# Patient Record
Sex: FEMALE | Race: WHITE | NOT HISPANIC OR LATINO | Employment: OTHER | URBAN - METROPOLITAN AREA
[De-identification: names, ages, dates, MRNs, and addresses within clinical notes are randomized per-mention and may not be internally consistent; named-entity substitution may affect disease eponyms.]

---

## 2017-01-09 ENCOUNTER — RECORDS - HEALTHEAST (OUTPATIENT)
Dept: MAMMOGRAPHY | Facility: CLINIC | Age: 62
End: 2017-01-09

## 2017-01-09 DIAGNOSIS — Z12.31 ENCOUNTER FOR SCREENING MAMMOGRAM FOR MALIGNANT NEOPLASM OF BREAST: ICD-10-CM

## 2017-02-20 ENCOUNTER — COMMUNICATION - HEALTHEAST (OUTPATIENT)
Dept: FAMILY MEDICINE | Facility: CLINIC | Age: 62
End: 2017-02-20

## 2017-02-20 DIAGNOSIS — R00.0 TACHYCARDIA: ICD-10-CM

## 2017-10-01 ENCOUNTER — COMMUNICATION - HEALTHEAST (OUTPATIENT)
Dept: FAMILY MEDICINE | Facility: CLINIC | Age: 62
End: 2017-10-01

## 2017-10-01 DIAGNOSIS — R00.0 TACHYCARDIA: ICD-10-CM

## 2017-11-17 ENCOUNTER — OFFICE VISIT - HEALTHEAST (OUTPATIENT)
Dept: FAMILY MEDICINE | Facility: CLINIC | Age: 62
End: 2017-11-17

## 2017-11-17 DIAGNOSIS — Z23 NEED FOR VARICELLA VACCINE: ICD-10-CM

## 2017-11-17 DIAGNOSIS — Z00.00 ROUTINE GENERAL MEDICAL EXAMINATION AT A HEALTH CARE FACILITY: ICD-10-CM

## 2017-11-17 DIAGNOSIS — Q21.11 OSTIUM SECUNDUM TYPE ATRIAL SEPTAL DEFECT: ICD-10-CM

## 2017-11-17 DIAGNOSIS — Z23 NEED FOR INFLUENZA VACCINATION: ICD-10-CM

## 2017-11-17 DIAGNOSIS — R00.2 PALPITATIONS: ICD-10-CM

## 2017-11-17 LAB
CHOLEST SERPL-MCNC: 188 MG/DL
FASTING STATUS PATIENT QL REPORTED: YES
HDLC SERPL-MCNC: 70 MG/DL
LDLC SERPL CALC-MCNC: 104 MG/DL
TRIGL SERPL-MCNC: 68 MG/DL

## 2017-11-17 ASSESSMENT — MIFFLIN-ST. JEOR: SCORE: 1266.88

## 2018-06-10 ENCOUNTER — COMMUNICATION - HEALTHEAST (OUTPATIENT)
Dept: FAMILY MEDICINE | Facility: CLINIC | Age: 63
End: 2018-06-10

## 2018-06-10 DIAGNOSIS — R00.2 PALPITATIONS: ICD-10-CM

## 2018-11-19 ENCOUNTER — OFFICE VISIT - HEALTHEAST (OUTPATIENT)
Dept: FAMILY MEDICINE | Facility: CLINIC | Age: 63
End: 2018-11-19

## 2018-11-19 DIAGNOSIS — Z00.00 ROUTINE GENERAL MEDICAL EXAMINATION AT A HEALTH CARE FACILITY: ICD-10-CM

## 2018-11-19 DIAGNOSIS — Q21.11 OSTIUM SECUNDUM TYPE ATRIAL SEPTAL DEFECT: ICD-10-CM

## 2018-11-19 DIAGNOSIS — Z12.11 COLON CANCER SCREENING: ICD-10-CM

## 2018-11-19 DIAGNOSIS — Z12.31 VISIT FOR SCREENING MAMMOGRAM: ICD-10-CM

## 2018-11-19 DIAGNOSIS — R00.2 PALPITATIONS: ICD-10-CM

## 2018-11-19 DIAGNOSIS — I48.91 ATRIAL FIBRILLATION, UNSPECIFIED TYPE (H): ICD-10-CM

## 2018-11-19 LAB
ALBUMIN SERPL-MCNC: 4 G/DL (ref 3.5–5)
ALP SERPL-CCNC: 84 U/L (ref 45–120)
ALT SERPL W P-5'-P-CCNC: 15 U/L (ref 0–45)
ANION GAP SERPL CALCULATED.3IONS-SCNC: 9 MMOL/L (ref 5–18)
AST SERPL W P-5'-P-CCNC: 22 U/L (ref 0–40)
BASOPHILS # BLD AUTO: 0 THOU/UL (ref 0–0.2)
BASOPHILS NFR BLD AUTO: 1 % (ref 0–2)
BILIRUB SERPL-MCNC: 0.7 MG/DL (ref 0–1)
BUN SERPL-MCNC: 17 MG/DL (ref 8–22)
CALCIUM SERPL-MCNC: 9.9 MG/DL (ref 8.5–10.5)
CHLORIDE BLD-SCNC: 105 MMOL/L (ref 98–107)
CHOLEST SERPL-MCNC: 202 MG/DL
CO2 SERPL-SCNC: 28 MMOL/L (ref 22–31)
CREAT SERPL-MCNC: 0.88 MG/DL (ref 0.6–1.1)
EOSINOPHIL # BLD AUTO: 0.2 THOU/UL (ref 0–0.4)
EOSINOPHIL NFR BLD AUTO: 5 % (ref 0–6)
ERYTHROCYTE [DISTWIDTH] IN BLOOD BY AUTOMATED COUNT: 12.7 % (ref 11–14.5)
FASTING STATUS PATIENT QL REPORTED: YES
GFR SERPL CREATININE-BSD FRML MDRD: >60 ML/MIN/1.73M2
GLUCOSE BLD-MCNC: 77 MG/DL (ref 70–125)
HCT VFR BLD AUTO: 42.9 % (ref 35–47)
HDLC SERPL-MCNC: 75 MG/DL
HGB BLD-MCNC: 14.1 G/DL (ref 12–16)
LDLC SERPL CALC-MCNC: 109 MG/DL
LYMPHOCYTES # BLD AUTO: 1.2 THOU/UL (ref 0.8–4.4)
LYMPHOCYTES NFR BLD AUTO: 28 % (ref 20–40)
MCH RBC QN AUTO: 28.3 PG (ref 27–34)
MCHC RBC AUTO-ENTMCNC: 32.9 G/DL (ref 32–36)
MCV RBC AUTO: 86 FL (ref 80–100)
MONOCYTES # BLD AUTO: 0.3 THOU/UL (ref 0–0.9)
MONOCYTES NFR BLD AUTO: 8 % (ref 2–10)
NEUTROPHILS # BLD AUTO: 2.4 THOU/UL (ref 2–7.7)
NEUTROPHILS NFR BLD AUTO: 58 % (ref 50–70)
PLATELET # BLD AUTO: 262 THOU/UL (ref 140–440)
PMV BLD AUTO: 6.9 FL (ref 7–10)
POTASSIUM BLD-SCNC: 4.4 MMOL/L (ref 3.5–5)
PROT SERPL-MCNC: 6.9 G/DL (ref 6–8)
RBC # BLD AUTO: 4.99 MILL/UL (ref 3.8–5.4)
SODIUM SERPL-SCNC: 142 MMOL/L (ref 136–145)
TRIGL SERPL-MCNC: 92 MG/DL
TSH SERPL DL<=0.005 MIU/L-ACNC: 1.84 UIU/ML (ref 0.3–5)
WBC: 4.2 THOU/UL (ref 4–11)

## 2018-11-19 ASSESSMENT — MIFFLIN-ST. JEOR: SCORE: 1251.01

## 2018-11-24 ENCOUNTER — COMMUNICATION - HEALTHEAST (OUTPATIENT)
Dept: FAMILY MEDICINE | Facility: CLINIC | Age: 63
End: 2018-11-24

## 2018-11-24 DIAGNOSIS — R00.2 PALPITATIONS: ICD-10-CM

## 2018-11-24 DIAGNOSIS — R00.0 TACHYCARDIA: ICD-10-CM

## 2018-11-26 ENCOUNTER — RECORDS - HEALTHEAST (OUTPATIENT)
Dept: MAMMOGRAPHY | Facility: CLINIC | Age: 63
End: 2018-11-26

## 2018-11-26 DIAGNOSIS — Z12.31 ENCOUNTER FOR SCREENING MAMMOGRAM FOR MALIGNANT NEOPLASM OF BREAST: ICD-10-CM

## 2019-01-04 ENCOUNTER — OFFICE VISIT - HEALTHEAST (OUTPATIENT)
Dept: FAMILY MEDICINE | Facility: CLINIC | Age: 64
End: 2019-01-04

## 2019-01-04 DIAGNOSIS — J01.00 ACUTE MAXILLARY SINUSITIS, RECURRENCE NOT SPECIFIED: ICD-10-CM

## 2019-08-30 ENCOUNTER — COMMUNICATION - HEALTHEAST (OUTPATIENT)
Dept: SCHEDULING | Facility: CLINIC | Age: 64
End: 2019-08-30

## 2019-08-30 ENCOUNTER — OFFICE VISIT - HEALTHEAST (OUTPATIENT)
Dept: FAMILY MEDICINE | Facility: CLINIC | Age: 64
End: 2019-08-30

## 2019-08-30 DIAGNOSIS — R31.9 BLOOD IN URINE: ICD-10-CM

## 2019-08-30 LAB
ALBUMIN UR-MCNC: NEGATIVE MG/DL
APPEARANCE UR: CLEAR
BACTERIA #/AREA URNS HPF: ABNORMAL HPF
BILIRUB UR QL STRIP: NEGATIVE
COLOR UR AUTO: YELLOW
GLUCOSE UR STRIP-MCNC: NEGATIVE MG/DL
HGB UR QL STRIP: ABNORMAL
KETONES UR STRIP-MCNC: NEGATIVE MG/DL
LEUKOCYTE ESTERASE UR QL STRIP: ABNORMAL
NITRATE UR QL: NEGATIVE
PH UR STRIP: 6.5 [PH] (ref 5–8)
RBC #/AREA URNS AUTO: ABNORMAL HPF
SP GR UR STRIP: 1.01 (ref 1–1.03)
SQUAMOUS #/AREA URNS AUTO: ABNORMAL LPF
UROBILINOGEN UR STRIP-ACNC: ABNORMAL
WBC #/AREA URNS AUTO: ABNORMAL HPF

## 2019-08-30 ASSESSMENT — MIFFLIN-ST. JEOR: SCORE: 1257.82

## 2019-09-02 LAB — BACTERIA SPEC CULT: ABNORMAL

## 2019-09-03 ENCOUNTER — COMMUNICATION - HEALTHEAST (OUTPATIENT)
Dept: FAMILY MEDICINE | Facility: CLINIC | Age: 64
End: 2019-09-03

## 2019-10-03 ENCOUNTER — COMMUNICATION - HEALTHEAST (OUTPATIENT)
Dept: FAMILY MEDICINE | Facility: CLINIC | Age: 64
End: 2019-10-03

## 2019-10-03 DIAGNOSIS — R00.2 PALPITATIONS: ICD-10-CM

## 2019-11-20 ENCOUNTER — COMMUNICATION - HEALTHEAST (OUTPATIENT)
Dept: FAMILY MEDICINE | Facility: CLINIC | Age: 64
End: 2019-11-20

## 2019-11-20 ENCOUNTER — OFFICE VISIT - HEALTHEAST (OUTPATIENT)
Dept: FAMILY MEDICINE | Facility: CLINIC | Age: 64
End: 2019-11-20

## 2019-11-20 DIAGNOSIS — Z11.59 ENCOUNTER FOR HEPATITIS C SCREENING TEST FOR LOW RISK PATIENT: ICD-10-CM

## 2019-11-20 DIAGNOSIS — Z23 NEED FOR SHINGLES VACCINE: ICD-10-CM

## 2019-11-20 DIAGNOSIS — Z00.00 ROUTINE GENERAL MEDICAL EXAMINATION AT A HEALTH CARE FACILITY: ICD-10-CM

## 2019-11-20 DIAGNOSIS — I51.7 CARDIOMEGALY: ICD-10-CM

## 2019-11-20 DIAGNOSIS — I48.91 ATRIAL FIBRILLATION, UNSPECIFIED TYPE (H): ICD-10-CM

## 2019-11-20 DIAGNOSIS — Z11.4 SCREENING FOR HUMAN IMMUNODEFICIENCY VIRUS WITHOUT PRESENCE OF RISK FACTORS: ICD-10-CM

## 2019-11-20 DIAGNOSIS — Q21.11 OSTIUM SECUNDUM TYPE ATRIAL SEPTAL DEFECT: ICD-10-CM

## 2019-11-20 DIAGNOSIS — Z23 NEED FOR INFLUENZA VACCINATION: ICD-10-CM

## 2019-11-20 LAB
ALBUMIN SERPL-MCNC: 4.1 G/DL (ref 3.5–5)
ALP SERPL-CCNC: 77 U/L (ref 45–120)
ALT SERPL W P-5'-P-CCNC: 15 U/L (ref 0–45)
ANION GAP SERPL CALCULATED.3IONS-SCNC: 9 MMOL/L (ref 5–18)
AST SERPL W P-5'-P-CCNC: 18 U/L (ref 0–40)
BASOPHILS # BLD AUTO: 0 THOU/UL (ref 0–0.2)
BASOPHILS NFR BLD AUTO: 1 % (ref 0–2)
BILIRUB SERPL-MCNC: 0.7 MG/DL (ref 0–1)
BUN SERPL-MCNC: 14 MG/DL (ref 8–22)
CALCIUM SERPL-MCNC: 10 MG/DL (ref 8.5–10.5)
CHLORIDE BLD-SCNC: 105 MMOL/L (ref 98–107)
CHOLEST SERPL-MCNC: 195 MG/DL
CO2 SERPL-SCNC: 27 MMOL/L (ref 22–31)
CREAT SERPL-MCNC: 1.03 MG/DL (ref 0.6–1.1)
EOSINOPHIL # BLD AUTO: 0.1 THOU/UL (ref 0–0.4)
EOSINOPHIL NFR BLD AUTO: 3 % (ref 0–6)
ERYTHROCYTE [DISTWIDTH] IN BLOOD BY AUTOMATED COUNT: 13.3 % (ref 11–14.5)
FASTING STATUS PATIENT QL REPORTED: YES
GFR SERPL CREATININE-BSD FRML MDRD: 54 ML/MIN/1.73M2
GLUCOSE BLD-MCNC: 95 MG/DL (ref 70–125)
HCT VFR BLD AUTO: 41.2 % (ref 35–47)
HDLC SERPL-MCNC: 73 MG/DL
HGB BLD-MCNC: 14.1 G/DL (ref 12–16)
HIV 1+2 AB+HIV1 P24 AG SERPL QL IA: NEGATIVE
LDLC SERPL CALC-MCNC: 102 MG/DL
LYMPHOCYTES # BLD AUTO: 1 THOU/UL (ref 0.8–4.4)
LYMPHOCYTES NFR BLD AUTO: 25 % (ref 20–40)
MCH RBC QN AUTO: 29.2 PG (ref 27–34)
MCHC RBC AUTO-ENTMCNC: 34.2 G/DL (ref 32–36)
MCV RBC AUTO: 85 FL (ref 80–100)
MONOCYTES # BLD AUTO: 0.3 THOU/UL (ref 0–0.9)
MONOCYTES NFR BLD AUTO: 8 % (ref 2–10)
NEUTROPHILS # BLD AUTO: 2.6 THOU/UL (ref 2–7.7)
NEUTROPHILS NFR BLD AUTO: 64 % (ref 50–70)
PLATELET # BLD AUTO: 277 THOU/UL (ref 140–440)
PMV BLD AUTO: 6.7 FL (ref 7–10)
POTASSIUM BLD-SCNC: 4.7 MMOL/L (ref 3.5–5)
PROT SERPL-MCNC: 6.8 G/DL (ref 6–8)
RBC # BLD AUTO: 4.83 MILL/UL (ref 3.8–5.4)
SODIUM SERPL-SCNC: 141 MMOL/L (ref 136–145)
TRIGL SERPL-MCNC: 102 MG/DL
TSH SERPL DL<=0.005 MIU/L-ACNC: 1.29 UIU/ML (ref 0.3–5)
WBC: 4 THOU/UL (ref 4–11)

## 2019-11-20 ASSESSMENT — MIFFLIN-ST. JEOR: SCORE: 1259.63

## 2019-11-21 LAB — HCV AB SERPL QL IA: NEGATIVE

## 2020-01-31 ENCOUNTER — AMBULATORY - HEALTHEAST (OUTPATIENT)
Dept: FAMILY MEDICINE | Facility: CLINIC | Age: 65
End: 2020-01-31

## 2020-01-31 DIAGNOSIS — Z23 NEED FOR SHINGLES VACCINE: ICD-10-CM

## 2020-02-05 ENCOUNTER — AMBULATORY - HEALTHEAST (OUTPATIENT)
Dept: NURSING | Facility: CLINIC | Age: 65
End: 2020-02-05

## 2020-02-05 DIAGNOSIS — Z23 NEED FOR SHINGLES VACCINE: ICD-10-CM

## 2020-09-07 ENCOUNTER — COMMUNICATION - HEALTHEAST (OUTPATIENT)
Dept: FAMILY MEDICINE | Facility: CLINIC | Age: 65
End: 2020-09-07

## 2020-09-07 DIAGNOSIS — I49.1 SUPRAVENTRICULAR PREMATURE BEATS: ICD-10-CM

## 2020-09-08 RX ORDER — ATENOLOL 25 MG/1
TABLET ORAL
Qty: 45 TABLET | Refills: 3 | Status: SHIPPED | OUTPATIENT
Start: 2020-09-08 | End: 2021-07-27

## 2020-11-04 ENCOUNTER — COMMUNICATION - HEALTHEAST (OUTPATIENT)
Dept: FAMILY MEDICINE | Facility: CLINIC | Age: 65
End: 2020-11-04

## 2020-11-04 ENCOUNTER — AMBULATORY - HEALTHEAST (OUTPATIENT)
Dept: FAMILY MEDICINE | Facility: CLINIC | Age: 65
End: 2020-11-04

## 2020-11-04 DIAGNOSIS — Z20.822 EXPOSURE TO COVID-19 VIRUS: ICD-10-CM

## 2021-03-03 ENCOUNTER — COMMUNICATION - HEALTHEAST (OUTPATIENT)
Dept: FAMILY MEDICINE | Facility: CLINIC | Age: 66
End: 2021-03-03

## 2021-05-25 ENCOUNTER — RECORDS - HEALTHEAST (OUTPATIENT)
Dept: ADMINISTRATIVE | Facility: CLINIC | Age: 66
End: 2021-05-25

## 2021-05-27 ENCOUNTER — RECORDS - HEALTHEAST (OUTPATIENT)
Dept: ADMINISTRATIVE | Facility: CLINIC | Age: 66
End: 2021-05-27

## 2021-05-28 ENCOUNTER — RECORDS - HEALTHEAST (OUTPATIENT)
Dept: ADMINISTRATIVE | Facility: CLINIC | Age: 66
End: 2021-05-28

## 2021-05-29 ENCOUNTER — RECORDS - HEALTHEAST (OUTPATIENT)
Dept: ADMINISTRATIVE | Facility: CLINIC | Age: 66
End: 2021-05-29

## 2021-05-30 ENCOUNTER — RECORDS - HEALTHEAST (OUTPATIENT)
Dept: ADMINISTRATIVE | Facility: CLINIC | Age: 66
End: 2021-05-30

## 2021-05-31 ENCOUNTER — RECORDS - HEALTHEAST (OUTPATIENT)
Dept: ADMINISTRATIVE | Facility: CLINIC | Age: 66
End: 2021-05-31

## 2021-05-31 VITALS — BODY MASS INDEX: 22.43 KG/M2 | WEIGHT: 148 LBS | HEIGHT: 68 IN

## 2021-05-31 NOTE — TELEPHONE ENCOUNTER
Blood in the urine started yesterday    Pushed fluids yesterday and felt better however during the night symptoms returned and blood in the urine is back    Now she feels like she has a fever    Blood in the urine    Pain with urine    Has an appointment later today.    In the meantime push fluids    Christina Troncoso RN   [facility] Medication Refill and Triage Nurse  8:15 AM  8/30/2019    Reason for Disposition    Pain or burning with passing urine    Protocols used: URINE - BLOOD IN-A-OH

## 2021-05-31 NOTE — PROGRESS NOTES
Assessment/ Plan  1. Blood in urine  Gross hematuria-witnessed  Presumed RBCs based on clots visualized, now gross hematuria resolved, only heme in urine  Presumed acute cystitis based on symptoms  Empiric treatment for 3 days and culture  If symptoms resolve and culture positive, no further work-up needed  If symptoms resolve and culture negative, needs further work-up to include kidney CT, urology evaluation which will include follow-up UA  Discussed all of this with patient.  - Urinalysis-UC if Indicated  - Culture, Urine  - sulfamethoxazole-trimethoprim (SEPTRA DS) 800-160 mg per tablet; Take 1 tablet by mouth 2 (two) times a day.  Dispense: 6 tablet; Refill: 0    Body mass index is 23.23 kg/m .    Subjective  CC:  Chief Complaint   Patient presents with     Hematuria     possibly UTI, frequently urinated     HPI:  Blood in urine  Narrative: Blood in urine with clots and some dysuria  ---------------------  Time of onset/duration: since yest am- started  Fine through the day  Blood and burning this am  Fel chilled  Timing /intermittent vs constant? intermittent  Severity/ appearance of urine: Brownish-red a few clots at its worst, pink at the beginning, now clear  History of trauma? no  Associated symptoms  Dysuria? yes  Urinary frequency? yes  Back pain? no  Fever/chills/sweats? Yes but isolated      Previous history of hematuria or kidney stones?  No  Patient Active Problem List   Diagnosis     Allergies     Carpal Tunnel Syndrome     Right Ventricular Enlargement     Palpitations     Atrial Premature Complex     Atrial Septal Defect     Migraine Headache     Benign Adenomatous Polyp Of The Large Intestine     Atrial Fibrillation     Tachycardia     Post-menopausal bleeding     Blood in urine     Current medications reviewed as follows:  Current Outpatient Medications on File Prior to Visit   Medication Sig     aspirin 81 MG EC tablet Take 162 mg by mouth daily.     atenolol (TENORMIN) 25 MG tablet Take 0.5  "tablets (12.5 mg total) by mouth daily.     b complex vitamins tablet Take 1 tablet by mouth daily.     loratadine (CLARITIN) 10 mg tablet Take 10 mg by mouth daily. Only takes between April- October     atenolol (TENORMIN) 25 MG tablet TAKE ONE-HALF TABLET BY  MOUTH DAILY     atenolol (TENORMIN) 25 MG tablet TAKE ONE-HALF TABLET BY  MOUTH DAILY     atenolol (TENORMIN) 25 MG tablet TAKE ONE-HALF TABLET BY  MOUTH DAILY     No current facility-administered medications on file prior to visit.      Social History     Tobacco Use   Smoking Status Never Smoker   Smokeless Tobacco Never Used     Social History     Social History Narrative     Not on file     Patient Care Team:  Lorie Ladd MD as PCP - General  ROS  Full 10 system review including constitutional, respiratory, cardiac, gi, urinary, rheumatologic, neurologic, reproductive, dermatologic psychiatric is  performed  and is otherwise negative         Objective  Physical Exam  Vitals:    08/30/19 1515   BP: 116/72   Patient Site: Right Arm   Patient Position: Sitting   Cuff Size: Adult Regular   Pulse: 80   Resp: 16   Temp: 97.6  F (36.4  C)   TempSrc: Oral   Weight: 148 lb (67.1 kg)   Height: 5' 6.93\" (1.7 m)     Abdomen soft and nontender, mild suprapubic tenderness on firm palpation.  No flank tenderness.  Patient does not appear acutely ill, is afebrile  Diagnostics  Results for orders placed or performed in visit on 08/30/19   Urinalysis-UC if Indicated   Result Value Ref Range    Color, UA Yellow Colorless, Yellow, Straw, Light Yellow    Clarity, UA Clear Clear    Glucose, UA Negative Negative    Bilirubin, UA Negative Negative    Ketones, UA Negative Negative    Specific Gravity, UA 1.010 1.005 - 1.030    Blood, UA Moderate (!) Negative    pH, UA 6.5 5.0 - 8.0    Protein, UA Negative Negative mg/dL    Urobilinogen, UA 0.2 E.U./dL 0.2 E.U./dL, 1.0 E.U./dL    Nitrite, UA Negative Negative    Leukocytes, UA Small (!) Negative    Bacteria, UA " Few (!) None Seen hpf    RBC, UA 0-2 None Seen, 0-2 hpf    WBC, UA 0-5 None Seen, 0-5 hpf    Squam Epithel, UA 0-5 None Seen, 0-5 lpf         Please note: Voice recognition software was used in this dictation.  It may therefore contain typographical errors.

## 2021-06-02 ENCOUNTER — RECORDS - HEALTHEAST (OUTPATIENT)
Dept: ADMINISTRATIVE | Facility: CLINIC | Age: 66
End: 2021-06-02

## 2021-06-02 VITALS — BODY MASS INDEX: 21.9 KG/M2 | HEIGHT: 68 IN | WEIGHT: 144.5 LBS

## 2021-06-02 VITALS — BODY MASS INDEX: 22.38 KG/M2 | WEIGHT: 145 LBS

## 2021-06-02 NOTE — TELEPHONE ENCOUNTER
Refill Approved    Rx renewed per Medication Renewal Policy. Medication was last renewed on 11/27/18.    Lindsay Ordonez, [facility] Triage/Med Refill 10/4/2019     Requested Prescriptions   Pending Prescriptions Disp Refills     atenolol (TENORMIN) 25 MG tablet [Pharmacy Med Name: ATENOLOL  25MG  TAB] 45 tablet      Sig: TAKE ONE-HALF TABLET BY  MOUTH DAILY       Beta-Blockers Refill Protocol Passed - 10/3/2019  8:29 PM        Passed - PCP or prescribing provider visit in past 12 months or next 3 months     Last office visit with prescriber/PCP: 6/6/2016 Lorie Ladd MD OR same dept: 8/30/2019 Enrique Montano MD OR same specialty: 8/30/2019 Enrique Montano MD  Last physical: 11/19/2018 Last MTM visit: Visit date not found   Next visit within 3 mo: Visit date not found  Next physical within 3 mo: Visit date not found  Prescriber OR PCP: Lorie Ladd MD  Last diagnosis associated with med order: There are no diagnoses linked to this encounter.  If protocol passes may refill for 12 months if within 3 months of last provider visit (or a total of 15 months).             Passed - Blood pressure filed in past 12 months     BP Readings from Last 1 Encounters:   08/30/19 116/72

## 2021-06-03 VITALS — WEIGHT: 148 LBS | BODY MASS INDEX: 23.23 KG/M2 | HEIGHT: 67 IN

## 2021-06-03 NOTE — PROGRESS NOTES
Assessment:      Healthy female exam.    1. Routine general medical examination at a health care facility  Comprehensive Metabolic Panel    Lipid Profile    HM1(CBC and Differential)    Thyroid Stimulating Hormone (TSH)    HM1 (CBC with Diff)   2. Need for shingles vaccine  Varicella Zoster, Recombinant Vaccine IM   3. Need for influenza vaccination  Influenza, Recombinant, Inj, Quadrivalent, PF, 18+YRS   4. Screening for human immunodeficiency virus without presence of risk factors  HIV Antigen/Antibody Screening [facility]   5. Encounter for hepatitis C screening test for low risk patient  Hepatitis C Antibody (Anti-HCV)   6. Right Ventricular Enlargement  aspirin 81 MG EC tablet   7. Atrial Septal Defect     8. Atrial fibrillation, unspecified type (H)            Plan:       This is a 63 yo female here for physical exam;  1.  Health Maintenance - screening labs sent; due for shingles vaccine - ordered; due for seasonal influenza vaccine - ordered; discussed recommendation for HIV and hepatitis C screening - ordered  2.  Atrial Septal defect/right ventricular enlargement/atrial fibrillation - patient has remained stable in terms of cardiac function - continue ASA 81 mg daily.     Subjective:      Summer Kendrick is a 64 y.o. female who presents for an annual exam. The patient reports that there is not domestic violence in her life.     Here for physical  Stressed by mom - needing increasing cares -     Healthy Habits:   Regular Exercise: Yes  Sunscreen Use: Yes  Healthy Diet: Yes  Dental Visits Regularly: Yes  Seat Belt: Yes  Sexually active: No  Self Breast Exam Monthly:Yes  Hemoccults: No  Flex Sig: No  Colonoscopy: Yes        Immunization History   Administered Date(s) Administered     DT (pediatric) 11/09/2005     INFLUENZA,RECOMBINANT,INJ,PF QUADRIVALENT 18+YRS 11/19/2018, 11/20/2019     Influenza, seasonal,quad inj 6-35 mos 09/25/2009, 09/15/2014     Influenza,seasonal, Inj IIV3 11/09/2005, 11/14/2006      Influenza,seasonal,quad inj =/> 6months 2017     Td,adult,historic,unspecified 2005     Tdap 2012     ZOSTER, LIVE 2017     ZOSTER, RECOMBINANT, IM 2019     Immunization status: reviewed.    No exam data present    Gynecologic History  Patient's last menstrual period was 2014.  Contraception: post menopausal status  Last Pap: 16 Results were: normal  Last mammogram: 18. Results were: normal      OB History    Para Term  AB Living   3 3 3         SAB TAB Ectopic Multiple Live Births                  # Outcome Date GA Lbr Gurjit/2nd Weight Sex Delivery Anes PTL Lv   3 Term            2 Term            1 Term                Current Outpatient Medications   Medication Sig Dispense Refill     aspirin 81 MG EC tablet Take 1 tablet (81 mg total) by mouth every other day.  0     atenolol (TENORMIN) 25 MG tablet Take 0.5 tablets (12.5 mg total) by mouth daily. 10 tablet 0     atenolol (TENORMIN) 25 MG tablet TAKE ONE-HALF TABLET BY  MOUTH DAILY 45 tablet 3     atenolol (TENORMIN) 25 MG tablet TAKE ONE-HALF TABLET BY  MOUTH DAILY 45 tablet 3     b complex vitamins tablet Take 1 tablet by mouth daily.       loratadine (CLARITIN) 10 mg tablet Take 10 mg by mouth daily. Only takes between April- October       sulfamethoxazole-trimethoprim (SEPTRA DS) 800-160 mg per tablet Take 1 tablet by mouth 2 (two) times a day. 6 tablet 0     No current facility-administered medications for this visit.      History reviewed. No pertinent past medical history.  Past Surgical History:   Procedure Laterality Date     SD REASD W BYPASS      Description: Repair Of Atrial Secundum Septal Defect;  Recorded: 2014;     Nickel and Penicillins  Family History   Problem Relation Age of Onset     Breast cancer Mother 55     Social History     Socioeconomic History     Marital status:      Spouse name: Not on file     Number of children: Not on file     Years of education: Not on file  "    Highest education level: Not on file   Occupational History     Not on file   Social Needs     Financial resource strain: Not on file     Food insecurity:     Worry: Not on file     Inability: Not on file     Transportation needs:     Medical: Not on file     Non-medical: Not on file   Tobacco Use     Smoking status: Never Smoker     Smokeless tobacco: Never Used   Substance and Sexual Activity     Alcohol use: Yes     Alcohol/week: 1.0 standard drinks     Types: 1 Glasses of wine per week     Comment: Ocasional     Drug use: No     Sexual activity: Yes     Partners: Male   Lifestyle     Physical activity:     Days per week: Not on file     Minutes per session: Not on file     Stress: Not on file   Relationships     Social connections:     Talks on phone: Not on file     Gets together: Not on file     Attends Worship service: Not on file     Active member of club or organization: Not on file     Attends meetings of clubs or organizations: Not on file     Relationship status: Not on file     Intimate partner violence:     Fear of current or ex partner: Not on file     Emotionally abused: Not on file     Physically abused: Not on file     Forced sexual activity: Not on file   Other Topics Concern     Not on file   Social History Narrative     Not on file       Review of Systems  Review of Systems      Pertinent positives as noted in HPI; otherwise 12 point ROS negative.      Objective:         Vitals:    11/20/19 0809   BP: 92/70   Pulse: 92   Resp: 16   Temp: 98.1  F (36.7  C)   TempSrc: Oral   Weight: 146 lb 6.4 oz (66.4 kg)   Height: 5' 7.5\" (1.715 m)     Body mass index is 22.59 kg/m .    Physical  Physical Exam    EXAM:  BP 92/70 (Patient Site: Right Arm, Patient Position: Sitting, Cuff Size: Adult Regular)   Pulse 92   Temp 98.1  F (36.7  C) (Oral)   Resp 16   Ht 5' 7.5\" (1.715 m)   Wt 146 lb 6.4 oz (66.4 kg)   LMP 01/09/2014   BMI 22.59 kg/m     Gen:  NAD, appears well, well-hydrated  HEENT:  TMs " nl, oropharynx benign, nasal mucosa nl, conjunctiva clear  Neck:  Supple, no adenopathy, no thyromegaly, no carotid bruits, no JVD  Lungs:  Clear to auscultation bilaterally  Breast exam:  No breast lumps, no skin changes, no nipple discharge, no axillary adenopathy  Cor:  RRR no murmur  Abd:  Soft, nontender, BS+, no masses, no guarding or rebound, no HSM  Extr:  Neg., no significant DJD - knees, hips, ankles  Neuro:  No asymmetry, Nl motor tone/strength, nl sensation, reflexes =, gait nl, nl coordination, CN intact,   Skin:  Warm/dry

## 2021-06-04 VITALS
HEART RATE: 92 BPM | WEIGHT: 146.4 LBS | HEIGHT: 68 IN | TEMPERATURE: 98.1 F | BODY MASS INDEX: 22.19 KG/M2 | RESPIRATION RATE: 16 BRPM | DIASTOLIC BLOOD PRESSURE: 70 MMHG | SYSTOLIC BLOOD PRESSURE: 92 MMHG

## 2021-06-14 NOTE — PROGRESS NOTES
Assessment:      Healthy female exam.    1. Routine general medical examination at a health care facility  Comprehensive Metabolic Panel    Lipid Profile    Thyroid Stimulating Hormone (TSH)    Vitamin D, Total (25-Hydroxy)    Hemoglobin   2. Need for influenza vaccination  Influenza, Seasonal,Quad Inj, 36+ MOS   3. Need for varicella vaccine  Varicella-zoster vaccine subq   4. Palpitations     5. Atrial Septal Defect            Plan:      This is a 61 yo female - here for physical exam - no specific concerns addressed today.  Health maintenance - due for influenza vaccine and shingles vaccine - these were ordered and given today.  Has h/o ASD - surgically repaired - palpitations are minimal at this point.  Will check routine labs as noted.    Subjective:      Summer Kendrick is a 62 y.o. female who presents for an annual exam. The patient reports that there is not domestic violence in her life.     Healthy - no concerns    Healthy Habits:   Regular Exercise: Yes  Sunscreen Use: No  Healthy Diet: Yes  Dental Visits Regularly: Yes  Seat Belt: Yes  Sexually active: Yes  Self Breast Exam Monthly:Yes  Hemoccults: No  Flex Sig: No  Colonoscopy: \yes, up to date    Immunization History   Administered Date(s) Administered     DT (pediatric) 2005     Influenza, seasonal,quad inj 36+ mos 2017     Influenza, seasonal,quad inj 6-35 mos 2009, 09/15/2014     Td,adult,historic,unspecified 2005     Tdap 2012     ZOSTER 2017     Immunization status: reviewed - recommended vaccines as orderewd.    No exam data present    Gynecologic History  Patient's last menstrual period was 2014.  Contraception: post menopausal status  Last Pap: . Results were: normal  Last mammogram: 2017. Results were: normal      OB History    Para Term  AB Living   3 3 3      SAB TAB Ectopic Multiple Live Births             # Outcome Date GA Lbr Gurjit/2nd Weight Sex Delivery Anes PTL Lv   3 Term        "     2 Term            1 Term                   Current Outpatient Prescriptions   Medication Sig Dispense Refill     aspirin 81 MG EC tablet Take 162 mg by mouth daily.       atenolol (TENORMIN) 25 MG tablet Take 0.5 tablets (12.5 mg total) by mouth daily. 10 tablet 0     atenolol (TENORMIN) 25 MG tablet TAKE ONE-HALF TABLET BY  MOUTH DAILY 45 tablet 2     b complex vitamins tablet Take 1 tablet by mouth daily.       loratadine (CLARITIN) 10 mg tablet Take 10 mg by mouth daily. Only takes between April- October       No current facility-administered medications for this visit.      History reviewed. No pertinent past medical history.  Past Surgical History:   Procedure Laterality Date     MS REASD W BYPASS      Description: Repair Of Atrial Secundum Septal Defect;  Recorded: 06/03/2014;     Nickel and Penicillins  Family History   Problem Relation Age of Onset     Breast cancer Mother 55     Social History     Social History     Marital status:      Spouse name: N/A     Number of children: N/A     Years of education: N/A     Occupational History     Not on file.     Social History Main Topics     Smoking status: Never Smoker     Smokeless tobacco: Never Used     Alcohol use Not on file     Drug use: Not on file     Sexual activity: Not on file     Other Topics Concern     Not on file     Social History Narrative       Review of Systems  Review of Systems     Pertinent positives as noted in HPI; otherwise 12 point ROS negative.        Objective:         Vitals:    11/17/17 0741   BP: 100/60   Pulse: (!) 56   Resp: 16   Weight: 148 lb (67.1 kg)   Height: 5' 7.5\" (1.715 m)     Body mass index is 22.84 kg/(m^2).    Physical  Physical Exam    EXAM:  /60 (Patient Site: Right Arm, Patient Position: Sitting, Cuff Size: Adult Regular)  Pulse (!) 56  Resp 16  Ht 5' 7.5\" (1.715 m)  Wt 148 lb (67.1 kg)  LMP 01/09/2014  BMI 22.84 kg/m2   Gen:  NAD, appears well, well-hydrated  HEENT:  TMs nl, oropharynx " benign, nasal mucosa nl, conjunctiva clear  Neck:  Supple, no adenopathy, no thyromegaly, no carotid bruits, no JVD  Lungs:  Clear to auscultation bilaterally  Cor:  RRR no murmur  Abd:  Soft, nontender, BS+, no masses, no guarding or rebound, no HSM  Extr:  Neg.  Neuro:  No asymmetry  Skin:  Warm/dry

## 2021-06-16 PROBLEM — R31.9 BLOOD IN URINE: Status: ACTIVE | Noted: 2019-08-30

## 2021-06-19 NOTE — LETTER
Letter by Enrique Montano MD at      Author: Enrique Montano MD Service: -- Author Type: --    Filed:  Encounter Date: 9/3/2019 Status: (Other)         Summer Kendrick  [address] [place] [address]             September 3, 2019         Dear Ms. Kendrick,    Below are the results from your recent visit:    Resulted Orders   Urinalysis-UC if Indicated   Result Value Ref Range    Color, UA Yellow Colorless, Yellow, Straw, Light Yellow    Clarity, UA Clear Clear    Glucose, UA Negative Negative    Bilirubin, UA Negative Negative    Ketones, UA Negative Negative    Specific Gravity, UA 1.010 1.005 - 1.030    Blood, UA Moderate (!) Negative    pH, UA 6.5 5.0 - 8.0    Protein, UA Negative Negative mg/dL    Urobilinogen, UA 0.2 E.U./dL 0.2 E.U./dL, 1.0 E.U./dL    Nitrite, UA Negative Negative    Leukocytes, UA Small (!) Negative    Bacteria, UA Few (!) None Seen hpf    RBC, UA 0-2 None Seen, 0-2 hpf    WBC, UA 0-5 None Seen, 0-5 hpf    Squam Epithel, UA 0-5 None Seen, 0-5 lpf    Narrative    Urine Culture ordered based on [facility] criteria   Culture, Urine   Result Value Ref Range    Culture >100,000 col/ml Escherichia coli (!)        Summer, your urine culture came back positive.  If you feel better and your urine has completely cleared up, I do not think you need any special follow-up.    Please call with questions or contact us using [facility].    Sincerely,        Electronically signed by Enrique Montano MD

## 2021-06-21 NOTE — PROGRESS NOTES
Assessment:      Healthy female exam.  1. Routine general medical examination at a health care facility     2. Colon cancer screening  Ambulatory referral for Colonoscopy   3. Visit for screening mammogram  Mammo Screening Bilateral   4. Palpitations  Comprehensive Metabolic Panel    Lipid Profile    HM1(CBC and Differential)    Thyroid Stimulating Hormone (TSH)    HM1 (CBC with Diff)   5. Atrial Septal Defect     6. Atrial fibrillation, unspecified type (H)            Plan:      This is a 62 yo female with generally good health habits, here for physical exam:  1.  Health Maintenance - due for colonoscopy - referred; due for breast cancer screening - referred for mammogram; up to date on cervix cancer screening  2.  ASD with atrial fib - s/p surgical intervention - continues to do well     Subjective:      Summer Kendrick is a 63 y.o. female who presents for an annual exam. The patient is sexually active. The patient participates in regular exercise: yes. The patient reports that there is not domestic violence in her life.     Here for physical exam - no new concerns    Healthy Habits:   Regular Exercise: Yes  Sunscreen Use: Yes  Healthy Diet: Yes  Dental Visits Regularly: Yes  Seat Belt: Yes  Sexually active: Yes  Self Breast Exam Monthly:Yes  Hemoccults: No  Flex Sig: No  Colonoscopy: Yes  Lipid Profile: Yes  Glucose Screen: Yes  Prevention of Osteoporosis: No  Last Dexa: N/A  Guns at Home:  No      Immunization History   Administered Date(s) Administered     DT (pediatric) 11/09/2005     INFLUENZA,RECOMBINANT,INJ,PF QUADRIVALENT 18+YRS 11/19/2018     Influenza, seasonal,quad inj 36+ mos 11/17/2017     Influenza, seasonal,quad inj 6-35 mos 09/25/2009, 09/15/2014     Td,adult,historic,unspecified 11/09/2005     Tdap 08/22/2012     ZOSTER, LIVE 11/17/2017     Immunization status: up to date and documented, Flu vaccine given today..    No exam data present    Gynecologic History  Patient's last menstrual period was  2014.  Contraception: post menopausal status  Last Pap: 2016. Results were: normal  Last mammogram: 2017. Results were: normal      OB History    Para Term  AB Living   3 3 3         SAB TAB Ectopic Multiple Live Births                  # Outcome Date GA Lbr Gurjit/2nd Weight Sex Delivery Anes PTL Lv   3 Term            2 Term            1 Term                   Current Outpatient Medications   Medication Sig Dispense Refill     aspirin 81 MG EC tablet Take 162 mg by mouth daily.       atenolol (TENORMIN) 25 MG tablet Take 0.5 tablets (12.5 mg total) by mouth daily. 10 tablet 0     atenolol (TENORMIN) 25 MG tablet TAKE ONE-HALF TABLET BY  MOUTH DAILY 45 tablet 2     atenolol (TENORMIN) 25 MG tablet TAKE ONE-HALF TABLET BY  MOUTH DAILY 45 tablet 1     b complex vitamins tablet Take 1 tablet by mouth daily.       loratadine (CLARITIN) 10 mg tablet Take 10 mg by mouth daily. Only takes between April- October       No current facility-administered medications for this visit.      History reviewed. No pertinent past medical history.  Past Surgical History:   Procedure Laterality Date     CO REASD W BYPASS      Description: Repair Of Atrial Secundum Septal Defect;  Recorded: 2014;     Nickel and Penicillins  Family History   Problem Relation Age of Onset     Breast cancer Mother 55     Social History     Socioeconomic History     Marital status:      Spouse name: Not on file     Number of children: Not on file     Years of education: Not on file     Highest education level: Not on file   Social Needs     Financial resource strain: Not on file     Food insecurity - worry: Not on file     Food insecurity - inability: Not on file     Transportation needs - medical: Not on file     Transportation needs - non-medical: Not on file   Occupational History     Not on file   Tobacco Use     Smoking status: Never Smoker     Smokeless tobacco: Never Used   Substance and Sexual Activity     Alcohol  "use: Yes     Alcohol/week: 0.6 oz     Types: 1 Glasses of wine per week     Comment: Ocasional     Drug use: No     Sexual activity: Yes     Partners: Male   Other Topics Concern     Not on file   Social History Narrative     Not on file       Review of Systems  Review of Systems     Pertinent positives as noted in HPI; otherwise 12 point ROS negative.        Objective:         Vitals:    11/19/18 0737   BP: 102/68   Pulse: 62   Resp: 17   Temp: 97.8  F (36.6  C)   TempSrc: Oral   SpO2: 98%   Weight: 144 lb 8 oz (65.5 kg)   Height: 5' 7.5\" (1.715 m)     Body mass index is 22.3 kg/m .    Physical  Physical Exam    EXAM:  /68 (Patient Site: Right Arm, Patient Position: Sitting, Cuff Size: Adult Regular)   Pulse 62   Temp 97.8  F (36.6  C) (Oral)   Resp 17   Ht 5' 7.5\" (1.715 m)   Wt 144 lb 8 oz (65.5 kg)   LMP 01/09/2014   SpO2 98%   BMI 22.30 kg/m     Gen:  NAD, appears well, well-hydrated  HEENT:  TMs nl, oropharynx benign, nasal mucosa nl, conjunctiva clear  Neck:  Supple, no adenopathy, no thyromegaly, no carotid bruits, no JVD  Lungs:  Clear to auscultation bilaterally  Breast exam:  No breast lumps, no skin changes, no nipple discharge, no axillary adenopathy  Cor:  RRR no murmur  Abd:  Soft, nontender, BS+, no masses, no guarding or rebound, no HSM  Extr:  Neg.  Neuro:  No asymmetry, Nl motor tone/strength, nl sensation, reflexes =, gait nl, nl coordination, CN intact,   Skin:  Warm/dry        "

## 2021-06-22 NOTE — PROGRESS NOTES
Leaving [place] tomorrow    2 wks malar and teeth pain   Yellow green.   Cannot taste anything    Feels plugged.   Left malar but ears ok         OBJECTIVE:   Vitals:    01/04/19 1014   BP: 100/62   Pulse: 64   Resp: 20   Temp: 96.8  F (36  C)   SpO2: 99%      Wt is noted.  No diaphoresis  Eyes: nl eom, anicteric   External ears, nose: nl      Malar tender  Neck: nl nodes, supple, thyroid normal   Lungs: clear to ausc   Heart: regular rhythm  Abd: soft nontender     No cva (renal) tenderness  Neuro: no weakness  Skin no rash  Joints: uninflamed   No ketotic breath odor noted  Mental: euthymic  Ext: nontender calves   Gait: normal    Body mass index is 22.38 kg/m .      ASSESSMENT/PLAN:    1. Acute maxillary sinusitis, recurrence not specified  azithromycin (ZITHROMAX) 250 MG tablet    fluticasone (FLONASE) 50 mcg/actuation nasal spray     Etd.   / educated on nasal decongestant for the flight   More than 10 of fifteen total minutes time spent education counseling regarding the issues and care of same as listed in the assessment and plan of this note

## 2021-07-22 ENCOUNTER — RECORDS - HEALTHEAST (OUTPATIENT)
Dept: FAMILY MEDICINE | Facility: CLINIC | Age: 66
End: 2021-07-22

## 2021-07-22 DIAGNOSIS — Z12.31 OTHER SCREENING MAMMOGRAM: ICD-10-CM

## 2021-07-23 DIAGNOSIS — I49.1 SUPRAVENTRICULAR PREMATURE BEATS: ICD-10-CM

## 2021-07-27 RX ORDER — ATENOLOL 25 MG/1
TABLET ORAL
Qty: 45 TABLET | Refills: 3 | Status: SHIPPED | OUTPATIENT
Start: 2021-07-27 | End: 2022-09-02

## 2021-07-27 NOTE — TELEPHONE ENCOUNTER
"Routing refill request to provider for review/approval because:  Patient needs to be seen because it has been more than 1 year since last office visit.    Last Written Prescription Date:  9/8/2020  Last Fill Quantity: 45,  # refills: 3   Last office visit provider:  11/20/2019 Dr. Ladd     atenoloL (TENORMIN) 25 MG tablet 45 tablet 3 9/8/2020  No   Sig: TAKE ONE-HALF TABLET BY  MOUTH DAILY   Sent to pharmacy as: atenoloL 25 mg tablet (TENORMIN)   Notes to Pharmacy: Requesting 1 year supply   E-Prescribing Status: Receipt confirmed by pharmacy (9/8/2020  8:56 AM         Requested Prescriptions   Pending Prescriptions Disp Refills     atenolol (TENORMIN) 25 MG tablet [Pharmacy Med Name: ATENOLOL  25MG  TAB] 45 tablet 3     Sig: TAKE ONE-HALF TABLET BY  MOUTH DAILY       Beta-Blockers Protocol Failed - 7/23/2021  9:31 PM        Failed - Blood pressure under 140/90 in past 12 months     BP Readings from Last 3 Encounters:   11/20/19 92/70                 Failed - Recent (12 mo) or future (30 days) visit within the authorizing provider's specialty     Patient has had an office visit with the authorizing provider or a provider within the authorizing providers department within the previous 12 mos or has a future within next 30 days. See \"Patient Info\" tab in inbasket, or \"Choose Columns\" in Meds & Orders section of the refill encounter.              Passed - Patient is age 6 or older        Passed - Medication is active on med list             Lorraine Spence RN 07/27/21 4:33 PM  "

## 2021-08-21 ENCOUNTER — HEALTH MAINTENANCE LETTER (OUTPATIENT)
Age: 66
End: 2021-08-21

## 2021-10-16 ENCOUNTER — HEALTH MAINTENANCE LETTER (OUTPATIENT)
Age: 66
End: 2021-10-16

## 2021-10-20 DIAGNOSIS — Z13.220 LIPID SCREENING: ICD-10-CM

## 2021-10-20 DIAGNOSIS — I49.1 SUPRAVENTRICULAR PREMATURE BEATS: Primary | ICD-10-CM

## 2021-10-20 DIAGNOSIS — Z00.00 ADULT GENERAL MEDICAL EXAM: ICD-10-CM

## 2021-10-25 ENCOUNTER — LAB (OUTPATIENT)
Dept: LAB | Facility: CLINIC | Age: 66
End: 2021-10-25

## 2021-10-25 ENCOUNTER — OFFICE VISIT (OUTPATIENT)
Dept: FAMILY MEDICINE | Facility: CLINIC | Age: 66
End: 2021-10-25
Payer: COMMERCIAL

## 2021-10-25 VITALS
SYSTOLIC BLOOD PRESSURE: 119 MMHG | WEIGHT: 150.6 LBS | TEMPERATURE: 97.9 F | HEIGHT: 67 IN | HEART RATE: 67 BPM | BODY MASS INDEX: 23.64 KG/M2 | DIASTOLIC BLOOD PRESSURE: 79 MMHG

## 2021-10-25 DIAGNOSIS — Z00.00 ADULT GENERAL MEDICAL EXAM: Primary | ICD-10-CM

## 2021-10-25 DIAGNOSIS — Z13.220 LIPID SCREENING: ICD-10-CM

## 2021-10-25 DIAGNOSIS — Z00.00 ADULT GENERAL MEDICAL EXAM: ICD-10-CM

## 2021-10-25 DIAGNOSIS — R00.2 PALPITATIONS: ICD-10-CM

## 2021-10-25 DIAGNOSIS — Q21.11 OSTIUM SECUNDUM TYPE ATRIAL SEPTAL DEFECT: ICD-10-CM

## 2021-10-25 DIAGNOSIS — I49.1 SUPRAVENTRICULAR PREMATURE BEATS: ICD-10-CM

## 2021-10-25 PROBLEM — I51.7 RIGHT VENTRICULAR ENLARGEMENT: Status: ACTIVE | Noted: 2021-10-25

## 2021-10-25 PROBLEM — Q21.10 ATRIAL SEPTAL DEFECT: Status: ACTIVE | Noted: 2021-10-25

## 2021-10-25 LAB
ALBUMIN SERPL-MCNC: 4 G/DL (ref 3.5–5)
ALP SERPL-CCNC: 80 U/L (ref 45–120)
ALT SERPL W P-5'-P-CCNC: 11 U/L (ref 0–45)
ANION GAP SERPL CALCULATED.3IONS-SCNC: 10 MMOL/L (ref 5–18)
AST SERPL W P-5'-P-CCNC: 15 U/L (ref 0–40)
BILIRUB SERPL-MCNC: 0.8 MG/DL (ref 0–1)
BUN SERPL-MCNC: 13 MG/DL (ref 8–22)
CALCIUM SERPL-MCNC: 10.2 MG/DL (ref 8.5–10.5)
CHLORIDE BLD-SCNC: 106 MMOL/L (ref 98–107)
CHOLEST SERPL-MCNC: 181 MG/DL
CO2 SERPL-SCNC: 28 MMOL/L (ref 22–31)
CREAT SERPL-MCNC: 0.97 MG/DL (ref 0.6–1.1)
ERYTHROCYTE [DISTWIDTH] IN BLOOD BY AUTOMATED COUNT: 12.1 % (ref 10–15)
FASTING STATUS PATIENT QL REPORTED: YES
GFR SERPL CREATININE-BSD FRML MDRD: 62 ML/MIN/1.73M2
GLUCOSE BLD-MCNC: 95 MG/DL (ref 70–125)
HCT VFR BLD AUTO: 45 % (ref 35–47)
HDLC SERPL-MCNC: 62 MG/DL
HGB BLD-MCNC: 14.6 G/DL (ref 11.7–15.7)
LDLC SERPL CALC-MCNC: 97 MG/DL
MCH RBC QN AUTO: 29.1 PG (ref 26.5–33)
MCHC RBC AUTO-ENTMCNC: 32.4 G/DL (ref 31.5–36.5)
MCV RBC AUTO: 90 FL (ref 78–100)
PLATELET # BLD AUTO: 242 10E3/UL (ref 150–450)
POTASSIUM BLD-SCNC: 4.3 MMOL/L (ref 3.5–5)
PROT SERPL-MCNC: 6.7 G/DL (ref 6–8)
RBC # BLD AUTO: 5.01 10E6/UL (ref 3.8–5.2)
SODIUM SERPL-SCNC: 144 MMOL/L (ref 136–145)
TRIGL SERPL-MCNC: 109 MG/DL
TSH SERPL DL<=0.005 MIU/L-ACNC: 2.34 UIU/ML (ref 0.3–5)
WBC # BLD AUTO: 6.1 10E3/UL (ref 4–11)

## 2021-10-25 PROCEDURE — 90686 IIV4 VACC NO PRSV 0.5 ML IM: CPT | Performed by: FAMILY MEDICINE

## 2021-10-25 PROCEDURE — 84443 ASSAY THYROID STIM HORMONE: CPT

## 2021-10-25 PROCEDURE — 80061 LIPID PANEL: CPT

## 2021-10-25 PROCEDURE — 80053 COMPREHEN METABOLIC PANEL: CPT

## 2021-10-25 PROCEDURE — 99396 PREV VISIT EST AGE 40-64: CPT | Mod: 25 | Performed by: FAMILY MEDICINE

## 2021-10-25 PROCEDURE — 0004A PR COVID VAC PFIZER DIL RECON 30 MCG/0.3 ML IM: CPT | Performed by: FAMILY MEDICINE

## 2021-10-25 PROCEDURE — 91300 PR COVID VAC PFIZER DIL RECON 30 MCG/0.3 ML IM: CPT | Performed by: FAMILY MEDICINE

## 2021-10-25 PROCEDURE — 36415 COLL VENOUS BLD VENIPUNCTURE: CPT

## 2021-10-25 PROCEDURE — 90471 IMMUNIZATION ADMIN: CPT | Performed by: FAMILY MEDICINE

## 2021-10-25 PROCEDURE — 85027 COMPLETE CBC AUTOMATED: CPT

## 2021-10-25 RX ORDER — MULTIVITAMIN WITH IRON
TABLET ORAL
COMMUNITY
End: 2024-04-01

## 2021-10-25 ASSESSMENT — MIFFLIN-ST. JEOR: SCORE: 1270.24

## 2021-10-25 NOTE — PROGRESS NOTES
SUBJECTIVE:   CC: Summre Kendrick is an 66 year old woman who presents for preventive health visit.       Patient has been advised of split billing requirements and indicates understanding: Yes  Healthy Habits:     Getting at least 3 servings of Calcium per day:  Yes    Bi-annual eye exam:  Yes    Dental care twice a year:  Yes    Sleep apnea or symptoms of sleep apnea:  None    Diet:  Regular (no restrictions)    Frequency of exercise:  6-7 days/week    Duration of exercise:  45-60 minutes    Taking medications regularly:  Yes    Barriers to taking medications:  Not applicable    Medication side effects:  Not applicable    PHQ-2 Total Score: 0    Additional concerns today:  No      Today's PHQ-2 Score:   PHQ-2 ( 1999 [employer]) 10/19/2021   Q1: Little interest or pleasure in doing things 0   Q2: Feeling down, depressed or hopeless 0   PHQ-2 Score 0   Q1: Little interest or pleasure in doing things Not at all   Q2: Feeling down, depressed or hopeless Not at all   PHQ-2 Score 0       Abuse: Current or Past (Physical, Sexual or Emotional) - No  Do you feel safe in your environment? Yes    Have you ever done Advance Care Planning? (For example, a Health Directive, POLST, or a discussion with a medical provider or your loved ones about your wishes): Yes, patient states has an Advance Care Planning document and will bring a copy to the clinic.    Social History     Tobacco Use     Smoking status: Never Smoker     Smokeless tobacco: Never Used   Substance Use Topics     Alcohol use: Yes     Alcohol/week: 1.0 standard drinks     Comment: Alcoholic Drinks/day: Ocasional     If you drink alcohol do you typically have >3 drinks per day or >7 drinks per week? No    No flowsheet data found.    Reviewed orders with patient.  Reviewed health maintenance and updated orders accordingly - Yes  BP Readings from Last 3 Encounters:   10/25/21 119/79   11/20/19 92/70    Wt Readings from Last 3 Encounters:   10/25/21 68.3 kg (150 lb 9.6  oz)   11/20/19 66.4 kg (146 lb 6.4 oz)   08/30/19 67.1 kg (148 lb)                  Patient Active Problem List   Diagnosis     Allergies     Carpal Tunnel Syndrome     Right Ventricular Enlargement     Palpitations     Atrial Premature Complex     Atrial Septal Defect     Migraine Headache     Benign Adenomatous Polyp Of The Large Intestine     Atrial Fibrillation     Tachycardia     Post-menopausal bleeding     Blood in urine     Atrial septal defect     Right ventricular enlargement     Past Surgical History:   Procedure Laterality Date     C REPR ASD W BYPASS      Description: Repair Of Atrial Secundum Septal Defect;  Recorded: 06/03/2014;       Social History     Tobacco Use     Smoking status: Never Smoker     Smokeless tobacco: Never Used   Substance Use Topics     Alcohol use: Yes     Alcohol/week: 1.0 standard drinks     Comment: Alcoholic Drinks/day: Ocasional     Family History   Problem Relation Age of Onset     Breast Cancer Mother 55.00         Current Outpatient Medications   Medication Sig Dispense Refill     atenolol (TENORMIN) 25 MG tablet TAKE ONE-HALF TABLET BY  MOUTH DAILY 45 tablet 3     b complex vitamins tablet [B COMPLEX VITAMINS TABLET] Take 1 tablet by mouth daily.       cholecalciferol (VITAMIN D3) 10 mcg (400 units) TABS tablet        magnesium 250 MG tablet        Allergies   Allergen Reactions     Nickel Unknown     Penicillins Rash       Breast Cancer Screening:  Any new diagnosis of family breast, ovarian, or bowel cancer? No    FHS-7: No flowsheet data found.    Mammogram Screening: Recommended mammography every 1-2 years with patient discussion and risk factor consideration  Pertinent mammograms are reviewed under the imaging tab.    History of abnormal Pap smear: NO - age 30-65 PAP every 5 years with negative HPV co-testing recommended  PAP / HPV 11/11/2016 9/15/2014   PAP Negative for squamous intraepithelial lesion or malignancy  Electronically signed by Lucinda Alvarado CT  "([facility]) on 2016 at  8:49 AM   Negative for squamous intraepithelial lesion or malignancy  Electronically signed by Lucinda Alvarado CT ([facility]) on 2014 at  3:08 PM       Reviewed and updated as needed this visit by clinical staff  Tobacco  Allergies  Meds  Problems  Med Hx  Surg Hx  Fam Hx          Reviewed and updated as needed this visit by Provider  Tobacco  Allergies  Meds  Problems  Med Hx  Surg Hx  Fam Hx         History reviewed. No pertinent past medical history.   Past Surgical History:   Procedure Laterality Date     C REPR ASD W BYPASS      Description: Repair Of Atrial Secundum Septal Defect;  Recorded: 2014;     OB History    Para Term  AB Living   3 3 3 0 0 0   SAB TAB Ectopic Multiple Live Births   0 0 0 0 0      # Outcome Date GA Lbr Gurjit/2nd Weight Sex Delivery Anes PTL Lv   3 Term            2 Term            1 Term                Review of Systems   Cardiovascular: Negative for chest pain and palpitations.   Gastrointestinal: Negative for abdominal pain.   Musculoskeletal:        Occ hip pain     All other systems reviewed and are negative.         OBJECTIVE:   /79   Pulse 67   Temp 97.9  F (36.6  C)   Ht 1.709 m (5' 7.28\")   Wt 68.3 kg (150 lb 9.6 oz)   BMI 23.39 kg/m    Physical Exam  Constitutional:       General: She is not in acute distress.     Appearance: She is well-developed.   HENT:      Right Ear: Tympanic membrane and external ear normal.      Left Ear: Tympanic membrane and external ear normal.      Nose: Nose normal.      Mouth/Throat:      Pharynx: No oropharyngeal exudate.   Eyes:      General:         Right eye: No discharge.         Left eye: No discharge.      Conjunctiva/sclera: Conjunctivae normal.      Pupils: Pupils are equal, round, and reactive to light.   Neck:      Thyroid: No thyromegaly.      Trachea: No tracheal deviation.   Cardiovascular:      Rate and Rhythm: Normal rate and regular rhythm.      Pulses: Normal " pulses.      Heart sounds: Normal heart sounds, S1 normal and S2 normal. No murmur heard.   No friction rub. No S3 or S4 sounds.    Pulmonary:      Effort: Pulmonary effort is normal. No respiratory distress.      Breath sounds: Normal breath sounds. No wheezing or rales.   Chest:      Breasts:         Right: No mass, nipple discharge or tenderness.         Left: No mass, nipple discharge or tenderness.   Abdominal:      General: Bowel sounds are normal.      Palpations: Abdomen is soft. There is no mass.      Tenderness: There is no abdominal tenderness.   Musculoskeletal:         General: Normal range of motion.      Cervical back: Neck supple.   Lymphadenopathy:      Cervical: No cervical adenopathy.   Skin:     General: Skin is warm and dry.      Findings: No rash.   Neurological:      Mental Status: She is alert and oriented to person, place, and time.      Motor: No abnormal muscle tone.      Deep Tendon Reflexes: Reflexes are normal and symmetric.   Psychiatric:         Thought Content: Thought content normal.         Judgment: Judgment normal.         Diagnostic Test Results:  Labs reviewed in Epic  Results for orders placed or performed in visit on 10/25/21   Lipid Profile (Chol, Trig, HDL, LDL calc)     Status: None   Result Value Ref Range    Cholesterol 181 <=199 mg/dL    Triglycerides 109 <=149 mg/dL    Direct Measure HDL 62 >=50 mg/dL    LDL Cholesterol Calculated 97 <=129 mg/dL    Patient Fasting > 8hrs? Yes    CBC with platelets     Status: Normal   Result Value Ref Range    WBC Count 6.1 4.0 - 11.0 10e3/uL    RBC Count 5.01 3.80 - 5.20 10e6/uL    Hemoglobin 14.6 11.7 - 15.7 g/dL    Hematocrit 45.0 35.0 - 47.0 %    MCV 90 78 - 100 fL    MCH 29.1 26.5 - 33.0 pg    MCHC 32.4 31.5 - 36.5 g/dL    RDW 12.1 10.0 - 15.0 %    Platelet Count 242 150 - 450 10e3/uL   TSH     Status: Normal   Result Value Ref Range    TSH 2.34 0.30 - 5.00 uIU/mL   Comprehensive metabolic panel (BMP + Alb, Alk Phos, ALT, AST,  "Total. Bili, TP)     Status: Normal   Result Value Ref Range    Sodium 144 136 - 145 mmol/L    Potassium 4.3 3.5 - 5.0 mmol/L    Chloride 106 98 - 107 mmol/L    Carbon Dioxide (CO2) 28 22 - 31 mmol/L    Anion Gap 10 5 - 18 mmol/L    Urea Nitrogen 13 8 - 22 mg/dL    Creatinine 0.97 0.60 - 1.10 mg/dL    Calcium 10.2 8.5 - 10.5 mg/dL    Glucose 95 70 - 125 mg/dL    Alkaline Phosphatase 80 45 - 120 U/L    AST 15 0 - 40 U/L    ALT 11 0 - 45 U/L    Protein Total 6.7 6.0 - 8.0 g/dL    Albumin 4.0 3.5 - 5.0 g/dL    Bilirubin Total 0.8 0.0 - 1.0 mg/dL    GFR Estimate 62 >60 mL/min/1.73m2       ASSESSMENT/PLAN:   1. Adult general medical exam  Here for physical exam - no significant concerns  - REVIEW OF HEALTH MAINTENANCE PROTOCOL ORDERS    2. Palpitations  Patient takes Atenolol - no new symptoms -     3. Atrial Septal Defect  S/p surgical repair - stable       Patient has been advised of split billing requirements and indicates understanding: Yes  COUNSELING:  Reviewed preventive health counseling, as reflected in patient instructions       Regular exercise       Healthy diet/nutrition    Estimated body mass index is 23.39 kg/m  as calculated from the following:    Height as of this encounter: 1.709 m (5' 7.28\").    Weight as of this encounter: 68.3 kg (150 lb 9.6 oz).        She reports that she has never smoked. She has never used smokeless tobacco.      Counseling Resources:  ATP IV Guidelines  Pooled Cohorts Equation Calculator  Breast Cancer Risk Calculator  BRCA-Related Cancer Risk Assessment: FHS-7 Tool  FRAX Risk Assessment  ICSI Preventive Guidelines  Dietary Guidelines for Americans, 2010  [employer] MyPlate  ASA Prophylaxis  Lung CA Screening    RICKY ZUNIGA MD  [facility]  "

## 2021-10-30 ASSESSMENT — ENCOUNTER SYMPTOMS
PALPITATIONS: 0
ABDOMINAL PAIN: 0

## 2022-09-02 DIAGNOSIS — I49.1 SUPRAVENTRICULAR PREMATURE BEATS: ICD-10-CM

## 2022-09-02 RX ORDER — ATENOLOL 25 MG/1
TABLET ORAL
Qty: 45 TABLET | Refills: 0 | Status: SHIPPED | OUTPATIENT
Start: 2022-09-02 | End: 2022-12-20

## 2022-09-02 NOTE — TELEPHONE ENCOUNTER
"Last Written Prescription Date:  7/27/21  Last Fill Quantity: 45,  # refills: 3   Last office visit provider:  10/25/21     Requested Prescriptions   Pending Prescriptions Disp Refills     atenolol (TENORMIN) 25 MG tablet 45 tablet 3       Beta-Blockers Protocol Passed - 9/2/2022  7:12 AM        Passed - Blood pressure under 140/90 in past 12 months     BP Readings from Last 3 Encounters:   10/25/21 119/79   11/20/19 92/70                 Passed - Patient is age 6 or older        Passed - Recent (12 mo) or future (30 days) visit within the authorizing provider's specialty     Patient has had an office visit with the authorizing provider or a provider within the authorizing providers department within the previous 12 mos or has a future within next 30 days. See \"Patient Info\" tab in inbasket, or \"Choose Columns\" in Meds & Orders section of the refill encounter.              Passed - Medication is active on med Lindsay Busby RN 09/02/22 6:21 PM  "

## 2022-10-01 ENCOUNTER — HEALTH MAINTENANCE LETTER (OUTPATIENT)
Age: 67
End: 2022-10-01

## 2022-11-16 ENCOUNTER — OFFICE VISIT (OUTPATIENT)
Dept: FAMILY MEDICINE | Facility: CLINIC | Age: 67
End: 2022-11-16
Payer: COMMERCIAL

## 2022-11-16 VITALS
HEART RATE: 68 BPM | RESPIRATION RATE: 16 BRPM | HEIGHT: 68 IN | DIASTOLIC BLOOD PRESSURE: 72 MMHG | SYSTOLIC BLOOD PRESSURE: 130 MMHG | OXYGEN SATURATION: 97 % | TEMPERATURE: 97.9 F | BODY MASS INDEX: 22.43 KG/M2 | WEIGHT: 148 LBS

## 2022-11-16 DIAGNOSIS — Z78.0 ASYMPTOMATIC MENOPAUSE: ICD-10-CM

## 2022-11-16 DIAGNOSIS — I49.1 SUPRAVENTRICULAR PREMATURE BEATS: ICD-10-CM

## 2022-11-16 DIAGNOSIS — M25.551 HIP PAIN, RIGHT: ICD-10-CM

## 2022-11-16 DIAGNOSIS — Z23 NEED FOR PROPHYLACTIC VACCINATION AGAINST DIPHTHERIA AND TETANUS: ICD-10-CM

## 2022-11-16 DIAGNOSIS — Z12.31 ENCOUNTER FOR SCREENING MAMMOGRAM FOR BREAST CANCER: ICD-10-CM

## 2022-11-16 DIAGNOSIS — Z23 NEED FOR PROPHYLACTIC VACCINATION AGAINST STREPTOCOCCUS PNEUMONIAE (PNEUMOCOCCUS): ICD-10-CM

## 2022-11-16 DIAGNOSIS — I48.91 ATRIAL FIBRILLATION, UNSPECIFIED TYPE (H): ICD-10-CM

## 2022-11-16 DIAGNOSIS — Z13.220 LIPID SCREENING: ICD-10-CM

## 2022-11-16 DIAGNOSIS — Z23 NEED FOR COVID-19 VACCINE: ICD-10-CM

## 2022-11-16 DIAGNOSIS — Z00.00 ADULT GENERAL MEDICAL EXAM: Primary | ICD-10-CM

## 2022-11-16 LAB
ALBUMIN SERPL BCG-MCNC: 4.7 G/DL (ref 3.5–5.2)
ALP SERPL-CCNC: 77 U/L (ref 35–104)
ALT SERPL W P-5'-P-CCNC: 16 U/L (ref 10–35)
ANION GAP SERPL CALCULATED.3IONS-SCNC: 13 MMOL/L (ref 7–15)
AST SERPL W P-5'-P-CCNC: 25 U/L (ref 10–35)
BILIRUB SERPL-MCNC: 0.7 MG/DL
BUN SERPL-MCNC: 14.5 MG/DL (ref 8–23)
CALCIUM SERPL-MCNC: 9.8 MG/DL (ref 8.8–10.2)
CHLORIDE SERPL-SCNC: 103 MMOL/L (ref 98–107)
CHOLEST SERPL-MCNC: 208 MG/DL
CREAT SERPL-MCNC: 0.99 MG/DL (ref 0.51–0.95)
DEPRECATED HCO3 PLAS-SCNC: 25 MMOL/L (ref 22–29)
ERYTHROCYTE [DISTWIDTH] IN BLOOD BY AUTOMATED COUNT: 12.7 % (ref 10–15)
GFR SERPL CREATININE-BSD FRML MDRD: 63 ML/MIN/1.73M2
GLUCOSE SERPL-MCNC: 95 MG/DL (ref 70–99)
HCT VFR BLD AUTO: 44.5 % (ref 35–47)
HDLC SERPL-MCNC: 76 MG/DL
HGB BLD-MCNC: 14.6 G/DL (ref 11.7–15.7)
LDLC SERPL CALC-MCNC: 114 MG/DL
MAGNESIUM SERPL-MCNC: 2.3 MG/DL (ref 1.7–2.3)
MCH RBC QN AUTO: 28.5 PG (ref 26.5–33)
MCHC RBC AUTO-ENTMCNC: 32.8 G/DL (ref 31.5–36.5)
MCV RBC AUTO: 87 FL (ref 78–100)
NONHDLC SERPL-MCNC: 132 MG/DL
PLATELET # BLD AUTO: 259 10E3/UL (ref 150–450)
POTASSIUM SERPL-SCNC: 4.4 MMOL/L (ref 3.4–5.3)
PROT SERPL-MCNC: 7.4 G/DL (ref 6.4–8.3)
RBC # BLD AUTO: 5.12 10E6/UL (ref 3.8–5.2)
SODIUM SERPL-SCNC: 141 MMOL/L (ref 136–145)
TRIGL SERPL-MCNC: 91 MG/DL
WBC # BLD AUTO: 5 10E3/UL (ref 4–11)

## 2022-11-16 PROCEDURE — 90677 PCV20 VACCINE IM: CPT | Performed by: FAMILY MEDICINE

## 2022-11-16 PROCEDURE — 90472 IMMUNIZATION ADMIN EACH ADD: CPT | Performed by: FAMILY MEDICINE

## 2022-11-16 PROCEDURE — 80053 COMPREHEN METABOLIC PANEL: CPT | Performed by: FAMILY MEDICINE

## 2022-11-16 PROCEDURE — G0402 INITIAL PREVENTIVE EXAM: HCPCS | Performed by: FAMILY MEDICINE

## 2022-11-16 PROCEDURE — 91312 COVID-19,PF,PFIZER BOOSTER BIVALENT: CPT | Performed by: FAMILY MEDICINE

## 2022-11-16 PROCEDURE — 80061 LIPID PANEL: CPT | Performed by: FAMILY MEDICINE

## 2022-11-16 PROCEDURE — G0009 ADMIN PNEUMOCOCCAL VACCINE: HCPCS | Mod: 59 | Performed by: FAMILY MEDICINE

## 2022-11-16 PROCEDURE — 36415 COLL VENOUS BLD VENIPUNCTURE: CPT | Performed by: FAMILY MEDICINE

## 2022-11-16 PROCEDURE — 83735 ASSAY OF MAGNESIUM: CPT | Performed by: FAMILY MEDICINE

## 2022-11-16 PROCEDURE — 0124A COVID-19,PF,PFIZER BOOSTER BIVALENT: CPT | Performed by: FAMILY MEDICINE

## 2022-11-16 PROCEDURE — 99213 OFFICE O/P EST LOW 20 MIN: CPT | Mod: 25 | Performed by: FAMILY MEDICINE

## 2022-11-16 PROCEDURE — 85027 COMPLETE CBC AUTOMATED: CPT | Performed by: FAMILY MEDICINE

## 2022-11-16 PROCEDURE — 90715 TDAP VACCINE 7 YRS/> IM: CPT | Performed by: FAMILY MEDICINE

## 2022-11-16 ASSESSMENT — ENCOUNTER SYMPTOMS
ABDOMINAL PAIN: 0
FEVER: 0
JOINT SWELLING: 0
FREQUENCY: 0
NERVOUS/ANXIOUS: 0
HEARTBURN: 0
EYE PAIN: 0
HEMATURIA: 0
ARTHRALGIAS: 1
PALPITATIONS: 0
DIZZINESS: 0
NAUSEA: 0
HEADACHES: 0
CONSTIPATION: 0
HEMATOCHEZIA: 0
PARESTHESIAS: 0
WEAKNESS: 0
DIARRHEA: 0
CHILLS: 0
SHORTNESS OF BREATH: 0
COUGH: 0
SORE THROAT: 0
DYSURIA: 0
MYALGIAS: 0

## 2022-11-16 ASSESSMENT — ACTIVITIES OF DAILY LIVING (ADL): CURRENT_FUNCTION: NO ASSISTANCE NEEDED

## 2022-11-16 NOTE — PROGRESS NOTES
"SUBJECTIVE:   Summer is a 67 year old who presents for Preventive Visit.        Patient has been advised of split billing requirements and indicates understanding: Yes  Are you in the first 12 months of your Medicare coverage?  Yes,  Visual Acuity:  Right Eye: 10/12   Left Eye: 10/12  Both Eyes: 10/12    Healthy Habits:     In general, how would you rate your overall health?  Excellent    Frequency of exercise:  4-5 days/week    Duration of exercise:  30-45 minutes    Do you usually eat at least 4 servings of fruit and vegetables a day, include whole grains    & fiber and avoid regularly eating high fat or \"junk\" foods?  Yes    Taking medications regularly:  Yes    Medication side effects:  None    Ability to successfully perform activities of daily living:  No assistance needed    Home Safety:  No safety concerns identified    Hearing Impairment:  No hearing concerns    In the past 6 months, have you been bothered by leaking of urine?  No    In general, how would you rate your overall mental or emotional health?  Good      PHQ-2 Total Score: 0    Additional concerns today:  No      Have you ever done Advance Care Planning? (For example, a Health Directive, POLST, or a discussion with a medical provider or your loved ones about your wishes): Yes, patient states has an Advance Care Planning document and will bring a copy to the clinic.       Fall risk  Fallen 2 or more times in the past year?: No  Any fall with injury in the past year?: No    Cognitive Screening   1) Repeat 3 items (Leader, Season, Table)    2) Clock draw: NORMAL  3) 3 item recall: Recalls 3 objects  Results: NORMAL clock, 1-2 items recalled: COGNITIVE IMPAIRMENT LESS LIKELY    Mini-CogTM Copyright YAO Quintanilla. Licensed by the author for use in [facility]; reprinted with permission (jeremy@.[facility]). All rights reserved.      Do you have sleep apnea, excessive snoring or daytime drowsiness?: no    Reviewed and updated as needed this visit by " clinical staff    Allergies  Meds              Reviewed and updated as needed this visit by Provider                 Social History     Tobacco Use     Smoking status: Never     Smokeless tobacco: Never   Substance Use Topics     Alcohol use: Yes     Alcohol/week: 1.0 standard drink     Comment: Alcoholic Drinks/day: Ocasional     If you drink alcohol do you typically have >3 drinks per day or >7 drinks per week? Yes    Alcohol Use 11/16/2022   Prescreen: >3 drinks/day or >7 drinks/week? No   Prescreen: >3 drinks/day or >7 drinks/week? -             Current providers sharing in care for this patient include:   Patient Care Team:  Lorie Ladd MD as PCP - General (Family Practice)  Lorie Ladd MD as Assigned PCP    The following health maintenance items are reviewed in Epic and correct as of today:  Health Maintenance   Topic Date Due     DEXA  Never done     ANNUAL REVIEW OF HM ORDERS  10/25/2022     MEDICARE ANNUAL WELLNESS VISIT  10/25/2022     COLORECTAL CANCER SCREENING  02/11/2023     FALL RISK ASSESSMENT  11/16/2023     MAMMO SCREENING  11/17/2024     LIPID  11/16/2027     ADVANCE CARE PLANNING  11/16/2027     DTAP/TDAP/TD IMMUNIZATION (3 - Td or Tdap) 11/16/2032     HEPATITIS C SCREENING  Completed     HIV SCREENING  Completed     PHQ-2 (once per calendar year)  Completed     INFLUENZA VACCINE  Completed     Pneumococcal Vaccine: 65+ Years  Completed     ZOSTER IMMUNIZATION  Completed     COVID-19 Vaccine  Completed     IPV IMMUNIZATION  Aged Out     MENINGITIS IMMUNIZATION  Aged Out     PAP  Discontinued     BP Readings from Last 3 Encounters:   11/16/22 130/72   10/25/21 119/79   11/20/19 92/70    Wt Readings from Last 3 Encounters:   11/16/22 67.1 kg (148 lb)   10/25/21 68.3 kg (150 lb 9.6 oz)   11/20/19 66.4 kg (146 lb 6.4 oz)                  Patient Active Problem List   Diagnosis     Allergies     Carpal Tunnel Syndrome     Right Ventricular Enlargement      Palpitations     Atrial Premature Complex     Atrial Septal Defect     Migraine Headache     Benign Adenomatous Polyp Of The Large Intestine     Atrial Fibrillation     Tachycardia     Post-menopausal bleeding     Blood in urine     Atrial septal defect     Right ventricular enlargement     Past Surgical History:   Procedure Laterality Date     ZZC REPR ASD W BYPASS      Description: Repair Of Atrial Secundum Septal Defect;  Recorded: 06/03/2014;       Social History     Tobacco Use     Smoking status: Never     Smokeless tobacco: Never   Substance Use Topics     Alcohol use: Yes     Alcohol/week: 1.0 standard drink     Comment: Alcoholic Drinks/day: Ocasional     Family History   Problem Relation Age of Onset     Breast Cancer Mother 55.00         Current Outpatient Medications   Medication Sig Dispense Refill     atenolol (TENORMIN) 25 MG tablet TAKE ONE-HALF TABLET BY  MOUTH DAILY 45 tablet 0     b complex vitamins tablet [B COMPLEX VITAMINS TABLET] Take 1 tablet by mouth daily.       magnesium 250 MG tablet        cholecalciferol (VITAMIN D3) 10 mcg (400 units) TABS tablet        Allergies   Allergen Reactions     Nickel Unknown     Penicillins Rash     Reviewed      FHS-7:   Breast CA Risk Assessment (FHS-7) 11/16/2022 11/17/2022   Did any of your first-degree relatives have breast or ovarian cancer? Yes Yes   Did any of your relatives have bilateral breast cancer? No No   Did any man in your family have breast cancer? No No   Did any woman in your family have breast and ovarian cancer? No No   Did any woman in your family have breast cancer before age 50 y? No No   Do you have 2 or more relatives with breast and/or ovarian cancer? No No   Do you have 2 or more relatives with breast and/or bowel cancer? No No     click delete button to remove this line now  Mammogram Screening: Recommended mammography every 1-2 years with patient discussion and risk factor consideration  Pertinent mammograms are reviewed  "under the imaging tab.    Review of Systems   Constitutional: Negative for chills and fever.   HENT: Negative for congestion, ear pain, hearing loss and sore throat.    Eyes: Negative for pain and visual disturbance.   Respiratory: Negative for cough and shortness of breath.    Cardiovascular: Negative for chest pain, palpitations and peripheral edema.   Gastrointestinal: Negative for abdominal pain, constipation, diarrhea, heartburn, hematochezia and nausea.   Genitourinary: Negative for dysuria, frequency, genital sores, hematuria and urgency.   Musculoskeletal: Positive for arthralgias. Negative for joint swelling and myalgias.   Skin: Negative for rash.   Neurological: Negative for dizziness, weakness, headaches and paresthesias.   Psychiatric/Behavioral: Negative for mood changes. The patient is not nervous/anxious.    All other systems reviewed and are negative.        OBJECTIVE:   /72 (BP Location: Left arm, Patient Position: Sitting, Cuff Size: Adult Regular)   Pulse 68   Temp 97.9  F (36.6  C) (Temporal)   Resp 16   Ht 1.715 m (5' 7.5\")   Wt 67.1 kg (148 lb)   SpO2 97%   BMI 22.84 kg/m   Estimated body mass index is 22.84 kg/m  as calculated from the following:    Height as of this encounter: 1.715 m (5' 7.5\").    Weight as of this encounter: 67.1 kg (148 lb).  Physical Exam  Vitals reviewed.   Constitutional:       General: She is not in acute distress.     Appearance: She is well-developed.   HENT:      Right Ear: Tympanic membrane and external ear normal.      Left Ear: Tympanic membrane and external ear normal.      Nose: Nose normal.      Mouth/Throat:      Pharynx: No oropharyngeal exudate.   Eyes:      General:         Right eye: No discharge.         Left eye: No discharge.      Conjunctiva/sclera: Conjunctivae normal.      Pupils: Pupils are equal, round, and reactive to light.   Neck:      Thyroid: No thyromegaly.      Trachea: No tracheal deviation.   Cardiovascular:      Rate and " Rhythm: Normal rate and regular rhythm.      Pulses: Normal pulses.      Heart sounds: Normal heart sounds, S1 normal and S2 normal. No murmur heard.    No friction rub. No S3 or S4 sounds.   Pulmonary:      Effort: Pulmonary effort is normal. No respiratory distress.      Breath sounds: Normal breath sounds. No wheezing or rales.   Chest:   Breasts:     Right: No mass, nipple discharge or tenderness.      Left: No mass, nipple discharge or tenderness.   Abdominal:      General: Bowel sounds are normal.      Palpations: Abdomen is soft. There is no mass.      Tenderness: There is no abdominal tenderness.   Musculoskeletal:         General: Normal range of motion.      Cervical back: Neck supple.   Lymphadenopathy:      Cervical: No cervical adenopathy.   Skin:     General: Skin is warm and dry.      Findings: No rash.   Neurological:      Mental Status: She is alert and oriented to person, place, and time.      Motor: No abnormal muscle tone.      Deep Tendon Reflexes: Reflexes are normal and symmetric.   Psychiatric:         Thought Content: Thought content normal.         Judgment: Judgment normal.           Diagnostic Test Results:  Labs reviewed in Epic  Results for orders placed or performed in visit on 11/16/22   CBC with platelets     Status: Normal   Result Value Ref Range    WBC Count 5.0 4.0 - 11.0 10e3/uL    RBC Count 5.12 3.80 - 5.20 10e6/uL    Hemoglobin 14.6 11.7 - 15.7 g/dL    Hematocrit 44.5 35.0 - 47.0 %    MCV 87 78 - 100 fL    MCH 28.5 26.5 - 33.0 pg    MCHC 32.8 31.5 - 36.5 g/dL    RDW 12.7 10.0 - 15.0 %    Platelet Count 259 150 - 450 10e3/uL   Comprehensive metabolic panel (BMP + Alb, Alk Phos, ALT, AST, Total. Bili, TP)     Status: Abnormal   Result Value Ref Range    Sodium 141 136 - 145 mmol/L    Potassium 4.4 3.4 - 5.3 mmol/L    Chloride 103 98 - 107 mmol/L    Carbon Dioxide (CO2) 25 22 - 29 mmol/L    Anion Gap 13 7 - 15 mmol/L    Urea Nitrogen 14.5 8.0 - 23.0 mg/dL    Creatinine 0.99 (H)  0.51 - 0.95 mg/dL    Calcium 9.8 8.8 - 10.2 mg/dL    Glucose 95 70 - 99 mg/dL    Alkaline Phosphatase 77 35 - 104 U/L    AST 25 10 - 35 U/L    ALT 16 10 - 35 U/L    Protein Total 7.4 6.4 - 8.3 g/dL    Albumin 4.7 3.5 - 5.2 g/dL    Bilirubin Total 0.7 <=1.2 mg/dL    GFR Estimate 63 >60 mL/min/1.73m2   Magnesium     Status: Normal   Result Value Ref Range    Magnesium 2.3 1.7 - 2.3 mg/dL   Lipid Profile (Chol, Trig, HDL, LDL calc)     Status: Abnormal   Result Value Ref Range    Cholesterol 208 (H) <200 mg/dL    Triglycerides 91 <150 mg/dL    Direct Measure HDL 76 >=50 mg/dL    LDL Cholesterol Calculated 114 (H) <=100 mg/dL    Non HDL Cholesterol 132 (H) <130 mg/dL    Narrative    Cholesterol  Desirable:  <200 mg/dL    Triglycerides  Normal:  Less than 150 mg/dL  Borderline High:  150-199 mg/dL  High:  200-499 mg/dL  Very High:  Greater than or equal to 500 mg/dL    Direct Measure HDL  Female:  Greater than or equal to 50 mg/dL   Male:  Greater than or equal to 40 mg/dL    LDL Cholesterol  Desirable:  <100mg/dL  Above Desirable:  100-129 mg/dL   Borderline High:  130-159 mg/dL   High:  160-189 mg/dL   Very High:  >= 190 mg/dL    Non HDL Cholesterol  Desirable:  130 mg/dL  Above Desirable:  130-159 mg/dL  Borderline High:  160-189 mg/dL  High:  190-219 mg/dL  Very High:  Greater than or equal to 220 mg/dL       ASSESSMENT / PLAN:   1. Adult general medical exam  This is a 68 yo female here for physical exam - doing well - no specific new concerns today   - CBC with platelets; Future  - CBC with platelets    2. Hip pain, right  Right hip pain - persistent - interfering with activity.  Son (who is a physical therapist) has recommended that she get an xray - and consider some PT.  Will review hip xray personally - shows some joint space narrowing/DJD.  Consider PT  - XR Hip Right 2-3 Views; Future    3. Supraventricular premature beats  H/o atrial fib/SVT - stable since previous ablation therapy.    - Comprehensive  metabolic panel (BMP + Alb, Alk Phos, ALT, AST, Total. Bili, TP); Future  - Magnesium; Future  - Comprehensive metabolic panel (BMP + Alb, Alk Phos, ALT, AST, Total. Bili, TP)  - Magnesium    4. Atrial fibrillation, unspecified type (H)  As noted above - check labs  - Comprehensive metabolic panel (BMP + Alb, Alk Phos, ALT, AST, Total. Bili, TP); Future  - Magnesium; Future  - Comprehensive metabolic panel (BMP + Alb, Alk Phos, ALT, AST, Total. Bili, TP)  - Magnesium    5. Lipid screening  Due for lipid screening - ordered   - Lipid Profile (Chol, Trig, HDL, LDL calc); Future  - Lipid Profile (Chol, Trig, HDL, LDL calc)    6. Need for COVID-19 vaccine  Due for COVID-19 booster   - COVID-19,PF,PFIZER BOOSTER BIVALENT    7. Need for prophylactic vaccination against Streptococcus pneumoniae (pneumococcus)  Due for pneumococcal vaccine - ordered   - PNEUMOCOCCAL 20 VALENT CONJUGATE (PREVNAR 20)    8. Need for prophylactic vaccination against diphtheria and tetanus  Due for tetanus booster - ordered  - TDAP VACCINE (Adacel, Boostrix)  [[id number]]    9. Asymptomatic menopause  Due for DEXA screening - ordered   - DX Hip/Pelvis/Spine; Future    10. Encounter for screening mammogram for breast cancer  Due for breast cancer screening - ordered   - *MA Screening Digital Bilateral; Future            COUNSELING:  Reviewed preventive health counseling, as reflected in patient instructions       Regular exercise       Healthy diet/nutrition        She reports that she has never smoked. She has never used smokeless tobacco.      Appropriate preventive services were discussed with this patient, including applicable screening as appropriate for cardiovascular disease, diabetes, osteopenia/osteoporosis, and glaucoma.  As appropriate for age/gender, discussed screening for colorectal cancer, prostate cancer, breast cancer, and cervical cancer. Checklist reviewing preventive services available has been given to the patient.    Reviewed  patients plan of care and provided an AVS. The Basic Care Plan (routine screening as documented in Health Maintenance) for Summer meets the Care Plan requirement. This Care Plan has been established and reviewed with the Patient.      RICKY ZUNIGA MD  [facility]    Identified Health Risks:

## 2022-11-17 ENCOUNTER — ANCILLARY PROCEDURE (OUTPATIENT)
Dept: MAMMOGRAPHY | Facility: HOSPITAL | Age: 67
End: 2022-11-17
Attending: FAMILY MEDICINE
Payer: COMMERCIAL

## 2022-11-17 ENCOUNTER — HOSPITAL ENCOUNTER (OUTPATIENT)
Dept: GENERAL RADIOLOGY | Facility: HOSPITAL | Age: 67
Discharge: HOME | End: 2022-11-17
Attending: FAMILY MEDICINE
Payer: COMMERCIAL

## 2022-11-17 DIAGNOSIS — Z12.31 ENCOUNTER FOR SCREENING MAMMOGRAM FOR BREAST CANCER: ICD-10-CM

## 2022-11-17 DIAGNOSIS — M25.551 HIP PAIN, RIGHT: ICD-10-CM

## 2022-11-17 PROCEDURE — 73502 X-RAY EXAM HIP UNI 2-3 VIEWS: CPT

## 2022-11-17 PROCEDURE — 77067 SCR MAMMO BI INCL CAD: CPT

## 2022-11-23 ENCOUNTER — ANCILLARY PROCEDURE (OUTPATIENT)
Dept: BONE DENSITY | Facility: CLINIC | Age: 67
End: 2022-11-23
Attending: FAMILY MEDICINE
Payer: COMMERCIAL

## 2022-11-23 DIAGNOSIS — Z78.0 ASYMPTOMATIC MENOPAUSE: ICD-10-CM

## 2022-11-23 PROCEDURE — 77080 DXA BONE DENSITY AXIAL: CPT | Performed by: INTERNAL MEDICINE

## 2022-12-19 DIAGNOSIS — I49.1 SUPRAVENTRICULAR PREMATURE BEATS: ICD-10-CM

## 2022-12-20 RX ORDER — ATENOLOL 25 MG/1
TABLET ORAL
Qty: 45 TABLET | Refills: 0 | Status: SHIPPED | OUTPATIENT
Start: 2022-12-20 | End: 2023-03-09

## 2022-12-21 NOTE — TELEPHONE ENCOUNTER
"Last Written Prescription Date:  9/2/2022  Last Fill Quantity: 45,  # refills: 0   Last office visit provider:  11/16/2022     Requested Prescriptions   Pending Prescriptions Disp Refills     atenolol (TENORMIN) 25 MG tablet [Pharmacy Med Name: Atenolol Oral Tablet 25 MG] 45 tablet 0     Sig: TAKE ONE-HALF TABLET BY  MOUTH DAILY       Beta-Blockers Protocol Passed - 12/19/2022 11:27 AM        Passed - Blood pressure under 140/90 in past 12 months     BP Readings from Last 3 Encounters:   11/16/22 130/72   10/25/21 119/79   11/20/19 92/70                 Passed - Patient is age 6 or older        Passed - Recent (12 mo) or future (30 days) visit within the authorizing provider's specialty     Patient has had an office visit with the authorizing provider or a provider within the authorizing providers department within the previous 12 mos or has a future within next 30 days. See \"Patient Info\" tab in inbasket, or \"Choose Columns\" in Meds & Orders section of the refill encounter.              Passed - Medication is active on med list             Christina Garcia RN 12/20/22 8:16 PM  "

## 2023-03-08 DIAGNOSIS — I49.1 SUPRAVENTRICULAR PREMATURE BEATS: ICD-10-CM

## 2023-03-09 RX ORDER — ATENOLOL 25 MG/1
12.5 TABLET ORAL DAILY
Qty: 45 TABLET | Refills: 0 | Status: SHIPPED | OUTPATIENT
Start: 2023-03-09 | End: 2023-06-05

## 2023-03-10 NOTE — TELEPHONE ENCOUNTER
"Last Written Prescription Date:  12/20/2022  Last Fill Quantity: 45,  # refills: 0   Last office visit provider:  11/16/2022     Requested Prescriptions   Pending Prescriptions Disp Refills     atenolol (TENORMIN) 25 MG tablet 45 tablet 0     Sig: Take 0.5 tablets (12.5 mg) by mouth daily       Beta-Blockers Protocol Passed - 3/8/2023  4:14 PM        Passed - Blood pressure under 140/90 in past 12 months     BP Readings from Last 3 Encounters:   11/16/22 130/72   10/25/21 119/79   11/20/19 92/70                 Passed - Patient is age 6 or older        Passed - Recent (12 mo) or future (30 days) visit within the authorizing provider's specialty     Patient has had an office visit with the authorizing provider or a provider within the authorizing providers department within the previous 12 mos or has a future within next 30 days. See \"Patient Info\" tab in inbasket, or \"Choose Columns\" in Meds & Orders section of the refill encounter.              Passed - Medication is active on med list             Christina Garcia RN 03/09/23 11:55 PM  "

## 2023-06-05 DIAGNOSIS — I49.1 SUPRAVENTRICULAR PREMATURE BEATS: ICD-10-CM

## 2023-06-05 RX ORDER — ATENOLOL 25 MG/1
12.5 TABLET ORAL DAILY
Qty: 45 TABLET | Refills: 0 | Status: SHIPPED | OUTPATIENT
Start: 2023-06-05 | End: 2023-07-21

## 2023-06-05 NOTE — TELEPHONE ENCOUNTER
"Last Written Prescription Date:  3/9/23  Last Fill Quantity: 45,  # refills: 0   Last office visit provider:  11/16/22, PCP     Requested Prescriptions   Pending Prescriptions Disp Refills     atenolol (TENORMIN) 25 MG tablet [Pharmacy Med Name: Atenolol Oral Tablet 25 MG] 45 tablet 0     Sig: Take 0.5 tablets (12.5 mg) by mouth daily       Beta-Blockers Protocol Passed - 6/5/2023  2:00 AM        Passed - Blood pressure under 140/90 in past 12 months     BP Readings from Last 3 Encounters:   11/16/22 130/72   10/25/21 119/79   11/20/19 92/70                 Passed - Patient is age 6 or older        Passed - Recent (12 mo) or future (30 days) visit within the authorizing provider's specialty     Patient has had an office visit with the authorizing provider or a provider within the authorizing providers department within the previous 12 mos or has a future within next 30 days. See \"Patient Info\" tab in inbasket, or \"Choose Columns\" in Meds & Orders section of the refill encounter.              Passed - Medication is active on med list             Elizabeth Weiss RN 06/05/23 12:24 PM  "

## 2023-10-02 ENCOUNTER — OFFICE VISIT (OUTPATIENT)
Dept: FAMILY MEDICINE | Facility: CLINIC | Age: 68
End: 2023-10-02
Payer: COMMERCIAL

## 2023-10-02 VITALS
TEMPERATURE: 97.6 F | HEIGHT: 67 IN | OXYGEN SATURATION: 99 % | WEIGHT: 153 LBS | DIASTOLIC BLOOD PRESSURE: 73 MMHG | SYSTOLIC BLOOD PRESSURE: 112 MMHG | HEART RATE: 67 BPM | BODY MASS INDEX: 24.01 KG/M2 | RESPIRATION RATE: 18 BRPM

## 2023-10-02 DIAGNOSIS — Q21.11 OSTIUM SECUNDUM TYPE ATRIAL SEPTAL DEFECT: ICD-10-CM

## 2023-10-02 DIAGNOSIS — H25.9 AGE-RELATED CATARACT OF BOTH EYES, UNSPECIFIED AGE-RELATED CATARACT TYPE: ICD-10-CM

## 2023-10-02 DIAGNOSIS — Z23 NEED FOR IMMUNIZATION AGAINST INFLUENZA: ICD-10-CM

## 2023-10-02 DIAGNOSIS — Z12.11 COLON CANCER SCREENING: ICD-10-CM

## 2023-10-02 DIAGNOSIS — Z01.818 PREOP EXAMINATION: Primary | ICD-10-CM

## 2023-10-02 DIAGNOSIS — I48.91 ATRIAL FIBRILLATION, UNSPECIFIED TYPE (H): ICD-10-CM

## 2023-10-02 LAB
ANION GAP SERPL CALCULATED.3IONS-SCNC: 12 MMOL/L (ref 7–15)
ATRIAL RATE - MUSE: 56 BPM
BUN SERPL-MCNC: 13.9 MG/DL (ref 8–23)
CALCIUM SERPL-MCNC: 10 MG/DL (ref 8.8–10.2)
CHLORIDE SERPL-SCNC: 104 MMOL/L (ref 98–107)
CREAT SERPL-MCNC: 0.96 MG/DL (ref 0.51–0.95)
DEPRECATED HCO3 PLAS-SCNC: 26 MMOL/L (ref 22–29)
DIASTOLIC BLOOD PRESSURE - MUSE: NORMAL MMHG
EGFRCR SERPLBLD CKD-EPI 2021: 65 ML/MIN/1.73M2
GLUCOSE SERPL-MCNC: 92 MG/DL (ref 70–99)
INTERPRETATION ECG - MUSE: NORMAL
P AXIS - MUSE: 74 DEGREES
POTASSIUM SERPL-SCNC: 4.5 MMOL/L (ref 3.4–5.3)
PR INTERVAL - MUSE: 182 MS
QRS DURATION - MUSE: 82 MS
QT - MUSE: 464 MS
QTC - MUSE: 447 MS
R AXIS - MUSE: 85 DEGREES
SODIUM SERPL-SCNC: 142 MMOL/L (ref 135–145)
SYSTOLIC BLOOD PRESSURE - MUSE: NORMAL MMHG
T AXIS - MUSE: 66 DEGREES
VENTRICULAR RATE- MUSE: 56 BPM

## 2023-10-02 PROCEDURE — G0008 ADMIN INFLUENZA VIRUS VAC: HCPCS | Performed by: FAMILY MEDICINE

## 2023-10-02 PROCEDURE — 93005 ELECTROCARDIOGRAM TRACING: CPT | Performed by: FAMILY MEDICINE

## 2023-10-02 PROCEDURE — 90662 IIV NO PRSV INCREASED AG IM: CPT | Performed by: FAMILY MEDICINE

## 2023-10-02 PROCEDURE — 80048 BASIC METABOLIC PNL TOTAL CA: CPT | Performed by: FAMILY MEDICINE

## 2023-10-02 PROCEDURE — 36415 COLL VENOUS BLD VENIPUNCTURE: CPT | Performed by: FAMILY MEDICINE

## 2023-10-02 PROCEDURE — 93010 ELECTROCARDIOGRAM REPORT: CPT | Performed by: INTERNAL MEDICINE

## 2023-10-02 PROCEDURE — 99214 OFFICE O/P EST MOD 30 MIN: CPT | Mod: 25 | Performed by: FAMILY MEDICINE

## 2023-10-02 NOTE — PROGRESS NOTES
[facility] [address] [place] [address]  Phone: [phone]  Fax: [phone]  Primary Provider: Lorie Ladd  Pre-op Performing Provider: LORIE LADD       PREOPERATIVE EVALUATION:  Today's date: 10/2/2023    Summer is a 68 year old female who presents for a preoperative evaluation.      10/2/2023     9:52 AM   Additional Questions   Roomed by Macy         10/2/2023     9:52 AM   Patient Reported Additional Medications   Patient reports taking the following new medications Allergy OTC       Surgical Information:  Surgery/Procedure: Cataract  Surgery Location: [facility]  Surgeon: Dr. Stokse  Surgery Date: Left Eye-11/17/23, Right Eye-12/1/23  Time of Surgery: TBD  Where patient plans to recover: At home with family  Fax number for surgical facility: [phone]    Assessment & Plan     The proposed surgical procedure is considered LOW risk.    Problem List Items Addressed This Visit          Circulatory    Atrial Septal Defect    Relevant Orders    EKG 12-lead, tracing only (Completed)    Atrial Fibrillation    Relevant Orders    Basic metabolic panel  (Ca, Cl, CO2, Creat, Gluc, K, Na, BUN) (Completed)     Other Visit Diagnoses       Preop examination    -  Primary    Relevant Orders    EKG 12-lead, tracing only (Completed)    Age-related cataract of both eyes, unspecified age-related cataract type        Colon cancer screening        Relevant Orders    Colonoscopy Screening  Referral    Need for immunization against influenza        Relevant Orders    INFLUENZA VACCINE 65+ (FLUZONE HD) (Completed)          This is a 69 yo female with bilateral cataracts.  She is relatively healthy with good health habits.  Has h/o ASD repair and h/o atrial fib.  She takes Atenolol.  EKG is obtained, labs ordered.  Rhythm has been stable - sinus.  She is stable.  OK for surgery.      Due for colon cancer screening - reviewed -  ordered.   Due for seasonal flu vaccine - ordered.            - No identified additional risk factors other than previously addressed    Antiplatelet or Anticoagulation Medication Instructions:   - Patient is on no antiplatelet or anticoagulation medications.   - Bleeding risk is low for this procedure (e.g. dental, skin, cataract).    Additional Medication Instructions:  Patient is to take all scheduled medications on the day of surgery    RECOMMENDATION:  APPROVAL GIVEN to proceed with proposed procedure, without further diagnostic evaluation.    Subjective       HPI related to upcoming procedure:   67 yo female with vision altering cataracts - bilateral - seen by ophthalmology and scheduled for cataract surgery.         9/25/2023     9:28 AM   Preop Questions   1. Have you ever had a heart attack or stroke? No   2. Have you ever had surgery on your heart or blood vessels, such as a stent placement, a coronary artery bypass, or surgery on an artery in your head, neck, heart, or legs? YES - ASD repair   3. Do you have chest pain with activity? No   4. Do you have a history of  heart failure? No   5. Do you currently have a cold, bronchitis or symptoms of other infection? No   6. Do you have a cough, shortness of breath, or wheezing? No   7. Do you or anyone in your family have previous history of blood clots? No   8. Do you or does anyone in your family have a serious bleeding problem such as prolonged bleeding following surgeries or cuts? No   9. Have you ever had problems with anemia or been told to take iron pills? No   10. Have you had any abnormal blood loss such as black, tarry or bloody stools, or abnormal vaginal bleeding? No   11. Have you ever had a blood transfusion? YES - after heart surgery had 2 units blood   11a. Have you ever had a transfusion reaction? No   12. Are you willing to have a blood transfusion if it is medically needed before, during, or after your surgery? Yes   13. Have you or any of  "your relatives ever had problems with anesthesia? No   14. Do you have sleep apnea, excessive snoring or daytime drowsiness? No   15. Do you have any artifical heart valves or other implanted medical devices like a pacemaker, defibrillator, or continuous glucose monitor? No   16. Do you have artificial joints? No   17. Are you allergic to latex? No       Health Care Directive:  Patient does not have a Health Care Directive or Living Will: no written document available.     Preoperative Review of :   reviewed - no record of controlled substances prescribed.          Review of Systems   Constitutional:  Negative for chills and fever.   HENT:  Negative for congestion, ear pain and sore throat.    Eyes:  Positive for visual disturbance (\"fuzzy\" vision). Negative for pain.   Respiratory:  Negative for cough and shortness of breath.    Cardiovascular:  Negative for chest pain, palpitations and peripheral edema.   Gastrointestinal:  Negative for abdominal pain, constipation and diarrhea.   Endocrine: Negative for polyuria.   Genitourinary:  Negative for dysuria, frequency and vaginal discharge.   Musculoskeletal:  Negative for arthralgias and myalgias.   Skin:  Negative for rash.   Allergic/Immunologic: Negative.    Neurological:  Negative for dizziness, weakness, headaches and paresthesias.   Hematological:  Does not bruise/bleed easily.   Psychiatric/Behavioral: Negative.           Patient Active Problem List    Diagnosis Date Noted    Atrial septal defect 10/25/2021     Priority: Medium     Formatting of this note might be different from the original.  Created by Conversion      Right ventricular enlargement 10/25/2021     Priority: Medium     Formatting of this note might be different from the original.  Created by Conversion      Blood in urine 08/30/2019     Priority: Medium    Post-menopausal bleeding 11/11/2016     Priority: Medium    Migraine Headache      Priority: Medium     Created by Conversion  [facility]: Nov 26 2008 10:15AM - Lorie Ladd:   always left sided  Replacement Utility updated for latest IMO load        Tachycardia 12/16/2015     Priority: Medium    Right Ventricular Enlargement      Priority: Medium     Created by Conversion        Atrial Septal Defect      Priority: Medium     Created by Conversion        Atrial Fibrillation      Priority: Medium     Created by Conversion  [facility] Annotation: Daljit  3 2014 10:49AM Lorie Winn:   Post -OP        Allergies      Priority: Medium     Created by Conversion  [facility] Annotation: Aug  5 2009  8:19AM Lorie Winn:   seasonal - spring to fall        Carpal Tunnel Syndrome      Priority: Medium     Created by Conversion        Palpitations      Priority: Medium     Created by Conversion        Atrial Premature Complex      Priority: Medium     Created by Conversion  [facility] Annotation: Nov 12 2012 10:11AM - Arnulfo Awad:   SymptomaticOver   11,000 PAC noted on Holter monitor - 10/31/12        Benign Adenomatous Polyp Of The Large Intestine      Priority: Medium     Created by Conversion  [facility] Annotation: Jan 28 2008  4:06PM - Lorie Ladd:   last colonoscopy 1/18/08; need repeat in 5  years per GI;          No past medical history on file.  Past Surgical History:   Procedure Laterality Date    ZZC REPR ASD W BYPASS      Description: Repair Of Atrial Secundum Septal Defect;  Recorded: 06/03/2014;     Current Outpatient Medications   Medication Sig Dispense Refill    atenolol (TENORMIN) 25 MG tablet Take 0.5 tablets (12.5 mg) by mouth daily 45 tablet 4    b complex vitamins tablet [B COMPLEX VITAMINS TABLET] Take 1 tablet by mouth daily.      cholecalciferol (VITAMIN D3) 10 mcg (400 units) TABS tablet       magnesium 250 MG tablet          Allergies   Allergen Reactions    Nickel Unknown    Penicillins Rash        Social History     Tobacco Use    Smoking status: Never  "   Smokeless tobacco: Never   Substance Use Topics    Alcohol use: Yes     Alcohol/week: 1.0 standard drink of alcohol     Comment: Alcoholic Drinks/day: Ocasional     Family History   Problem Relation Age of Onset    Breast Cancer Mother 55.00     History   Drug Use No         Objective     /73 (BP Location: Right arm, Patient Position: Sitting, Cuff Size: Adult Regular)   Pulse 67   Temp 97.6  F (36.4  C) (Temporal)   Resp 18   Ht 1.71 m (5' 7.32\")   Wt 69.4 kg (153 lb)   LMP  (LMP Unknown)   SpO2 99%   BMI 23.73 kg/m      Physical Exam  Vitals reviewed.   Constitutional:       General: She is not in acute distress.     Appearance: Normal appearance.   HENT:      Head: Normocephalic.      Right Ear: Tympanic membrane, ear canal and external ear normal.      Left Ear: Tympanic membrane, ear canal and external ear normal.      Nose: Nose normal.      Mouth/Throat:      Mouth: Mucous membranes are moist.      Pharynx: No posterior oropharyngeal erythema.   Eyes:      Extraocular Movements: Extraocular movements intact.      Conjunctiva/sclera: Conjunctivae normal.      Pupils: Pupils are equal, round, and reactive to light.   Cardiovascular:      Rate and Rhythm: Normal rate and regular rhythm.      Pulses: Normal pulses.      Heart sounds: Normal heart sounds. No murmur heard.  Pulmonary:      Effort: Pulmonary effort is normal.      Breath sounds: Normal breath sounds.   Abdominal:      Palpations: Abdomen is soft. There is no mass.      Tenderness: There is no abdominal tenderness. There is no guarding or rebound.   Musculoskeletal:         General: No deformity. Normal range of motion.      Cervical back: Normal range of motion and neck supple.   Lymphadenopathy:      Cervical: No cervical adenopathy.   Skin:     General: Skin is warm and dry.   Neurological:      General: No focal deficit present.      Mental Status: She is alert.   Psychiatric:         Mood and Affect: Mood normal.         " Behavior: Behavior normal.           Recent Labs   Lab Test 11/16/22  1404 10/25/21  0734   HGB 14.6 14.6    242    144   POTASSIUM 4.4 4.3   CR 0.99* 0.97        Diagnostics:  Recent Results (from the past 168 hour(s))   EKG 12-lead, tracing only    Collection Time: 10/02/23 11:01 AM   Result Value Ref Range    Systolic Blood Pressure  mmHg    Diastolic Blood Pressure  mmHg    Ventricular Rate 56 BPM    Atrial Rate 56 BPM    NY Interval 182 ms    QRS Duration 82 ms     ms    QTc 447 ms    P Axis 74 degrees    R AXIS 85 degrees    T Axis 66 degrees    Interpretation ECG       Sinus bradycardia  Otherwise normal ECG  When compared with ECG of 01-MAY-2014 10:31,  No significant change was found  Confirmed by JADYN REID, LIT LOC: ([id number]) on 10/2/2023 3:01:01 PM     Basic metabolic panel  (Ca, Cl, CO2, Creat, Gluc, K, Na, BUN)    Collection Time: 10/02/23 11:10 AM   Result Value Ref Range    Sodium 142 135 - 145 mmol/L    Potassium 4.5 3.4 - 5.3 mmol/L    Chloride 104 98 - 107 mmol/L    Carbon Dioxide (CO2) 26 22 - 29 mmol/L    Anion Gap 12 7 - 15 mmol/L    Urea Nitrogen 13.9 8.0 - 23.0 mg/dL    Creatinine 0.96 (H) 0.51 - 0.95 mg/dL    GFR Estimate 65 >60 mL/min/1.73m2    Calcium 10.0 8.8 - 10.2 mg/dL    Glucose 92 70 - 99 mg/dL          Revised Cardiac Risk Index (RCRI):  The patient has the following serious cardiovascular risks for perioperative complications:   - No serious cardiac risks = 0 points     RCRI Interpretation: 0 points: Class I (very low risk - 0.4% complication rate)         Signed Electronically by: RICKY ZUNIGA MD  Copy of this evaluation report is provided to requesting physician.        Prior to immunization administration, verified patients identity using patient s name and date of birth. Please see Immunization Activity for additional information.     Screening Questionnaire for Adult Immunization    Are you sick today?   No   Do you have allergies to  medications, food, a vaccine component or latex?   Yes   Have you ever had a serious reaction after receiving a vaccination?   No   Do you have a long-term health problem with heart, lung, kidney, or metabolic disease (e.g., diabetes), asthma, a blood disorder, no spleen, complement component deficiency, a cochlear implant, or a spinal fluid leak?  Are you on long-term aspirin therapy?   No   Do you have cancer, leukemia, HIV/AIDS, or any other immune system problem?   No   Do you have a parent, brother, or sister with an immune system problem?   No   In the past 3 months, have you taken medications that affect  your immune system, such as prednisone, other steroids, or anticancer drugs; drugs for the treatment of rheumatoid arthritis, Crohn s disease, or psoriasis; or have you had radiation treatments?   No   Have you had a seizure, or a brain or other nervous system problem?   No   During the past year, have you received a transfusion of blood or blood    products, or been given immune (gamma) globulin or antiviral drug?   No   For women: Are you pregnant or is there a chance you could become       pregnant during the next month?   No   Have you received any vaccinations in the past 4 weeks?   No     Immunization questionnaire was positive for at least one answer.  Notified Dr. Espitia.      Patient instructed to remain in clinic for 15 minutes afterwards, and to report any adverse reactions.     Screening performed by Macy Romo CMA on 10/2/2023 at 9:57 AM.

## 2023-10-08 ASSESSMENT — ENCOUNTER SYMPTOMS
MYALGIAS: 0
DYSURIA: 0
ALLERGIC/IMMUNOLOGIC NEGATIVE: 1
SORE THROAT: 0
FEVER: 0
FREQUENCY: 0
DIZZINESS: 0
SHORTNESS OF BREATH: 0
WEAKNESS: 0
ABDOMINAL PAIN: 0
CONSTIPATION: 0
EYE PAIN: 0
CHILLS: 0
HEADACHES: 0
ARTHRALGIAS: 0
PARESTHESIAS: 0
DIARRHEA: 0
COUGH: 0
BRUISES/BLEEDS EASILY: 0
PSYCHIATRIC NEGATIVE: 1
PALPITATIONS: 0

## 2023-10-16 ENCOUNTER — PATIENT OUTREACH (OUTPATIENT)
Dept: GASTROENTEROLOGY | Facility: CLINIC | Age: 68
End: 2023-10-16
Payer: COMMERCIAL

## 2023-11-10 ASSESSMENT — ENCOUNTER SYMPTOMS
BREAST MASS: 0
CHILLS: 0
FREQUENCY: 0
FEVER: 0
PALPITATIONS: 0
HEMATURIA: 0
MYALGIAS: 0
COUGH: 0
PARESTHESIAS: 0
DIARRHEA: 0
NAUSEA: 0
HEMATOCHEZIA: 0
SORE THROAT: 0
DYSURIA: 0
EYE PAIN: 0
CONSTIPATION: 0
JOINT SWELLING: 0
ABDOMINAL PAIN: 0
ARTHRALGIAS: 0
WEAKNESS: 0
DIZZINESS: 0
HEARTBURN: 0
SHORTNESS OF BREATH: 0
NERVOUS/ANXIOUS: 0
HEADACHES: 0

## 2023-11-10 ASSESSMENT — ACTIVITIES OF DAILY LIVING (ADL): CURRENT_FUNCTION: NO ASSISTANCE NEEDED

## 2023-11-17 ENCOUNTER — OFFICE VISIT (OUTPATIENT)
Dept: FAMILY MEDICINE | Facility: CLINIC | Age: 68
End: 2023-11-17
Payer: COMMERCIAL

## 2023-11-17 VITALS
DIASTOLIC BLOOD PRESSURE: 83 MMHG | OXYGEN SATURATION: 98 % | BODY MASS INDEX: 24.01 KG/M2 | RESPIRATION RATE: 16 BRPM | HEIGHT: 67 IN | HEART RATE: 64 BPM | TEMPERATURE: 97.4 F | SYSTOLIC BLOOD PRESSURE: 124 MMHG | WEIGHT: 153 LBS

## 2023-11-17 DIAGNOSIS — Q21.11 OSTIUM SECUNDUM TYPE ATRIAL SEPTAL DEFECT: ICD-10-CM

## 2023-11-17 DIAGNOSIS — Z00.00 ENCOUNTER FOR MEDICARE ANNUAL WELLNESS EXAM: Primary | ICD-10-CM

## 2023-11-17 DIAGNOSIS — Z12.11 SCREEN FOR COLON CANCER: ICD-10-CM

## 2023-11-17 DIAGNOSIS — I48.91 ATRIAL FIBRILLATION, UNSPECIFIED TYPE (H): ICD-10-CM

## 2023-11-17 DIAGNOSIS — H25.9 AGE-RELATED CATARACT OF BOTH EYES, UNSPECIFIED AGE-RELATED CATARACT TYPE: ICD-10-CM

## 2023-11-17 LAB
ALBUMIN SERPL BCG-MCNC: 4.5 G/DL (ref 3.5–5.2)
ALP SERPL-CCNC: 78 U/L (ref 40–150)
ALT SERPL W P-5'-P-CCNC: 15 U/L (ref 0–50)
ANION GAP SERPL CALCULATED.3IONS-SCNC: 12 MMOL/L (ref 7–15)
AST SERPL W P-5'-P-CCNC: 24 U/L (ref 0–45)
BASOPHILS # BLD AUTO: 0.1 10E3/UL (ref 0–0.2)
BASOPHILS NFR BLD AUTO: 1 %
BILIRUB SERPL-MCNC: 0.5 MG/DL
BUN SERPL-MCNC: 19.3 MG/DL (ref 8–23)
CALCIUM SERPL-MCNC: 10 MG/DL (ref 8.8–10.2)
CHLORIDE SERPL-SCNC: 103 MMOL/L (ref 98–107)
CHOLEST SERPL-MCNC: 204 MG/DL
CREAT SERPL-MCNC: 1.01 MG/DL (ref 0.51–0.95)
DEPRECATED HCO3 PLAS-SCNC: 25 MMOL/L (ref 22–29)
EGFRCR SERPLBLD CKD-EPI 2021: 61 ML/MIN/1.73M2
EOSINOPHIL # BLD AUTO: 0.3 10E3/UL (ref 0–0.7)
EOSINOPHIL NFR BLD AUTO: 7 %
ERYTHROCYTE [DISTWIDTH] IN BLOOD BY AUTOMATED COUNT: 12.6 % (ref 10–15)
GLUCOSE SERPL-MCNC: 89 MG/DL (ref 70–99)
HCT VFR BLD AUTO: 45.6 % (ref 35–47)
HDLC SERPL-MCNC: 69 MG/DL
HGB BLD-MCNC: 14.6 G/DL (ref 11.7–15.7)
IMM GRANULOCYTES # BLD: 0 10E3/UL
IMM GRANULOCYTES NFR BLD: 0 %
LDLC SERPL CALC-MCNC: 111 MG/DL
LYMPHOCYTES # BLD AUTO: 1.3 10E3/UL (ref 0.8–5.3)
LYMPHOCYTES NFR BLD AUTO: 27 %
MCH RBC QN AUTO: 28.1 PG (ref 26.5–33)
MCHC RBC AUTO-ENTMCNC: 32 G/DL (ref 31.5–36.5)
MCV RBC AUTO: 88 FL (ref 78–100)
MONOCYTES # BLD AUTO: 0.4 10E3/UL (ref 0–1.3)
MONOCYTES NFR BLD AUTO: 9 %
NEUTROPHILS # BLD AUTO: 2.6 10E3/UL (ref 1.6–8.3)
NEUTROPHILS NFR BLD AUTO: 55 %
NONHDLC SERPL-MCNC: 135 MG/DL
PLATELET # BLD AUTO: 272 10E3/UL (ref 150–450)
POTASSIUM SERPL-SCNC: 4.7 MMOL/L (ref 3.4–5.3)
PROT SERPL-MCNC: 7.3 G/DL (ref 6.4–8.3)
RBC # BLD AUTO: 5.19 10E6/UL (ref 3.8–5.2)
SODIUM SERPL-SCNC: 140 MMOL/L (ref 135–145)
TRIGL SERPL-MCNC: 121 MG/DL
TSH SERPL DL<=0.005 MIU/L-ACNC: 3.08 UIU/ML (ref 0.3–4.2)
WBC # BLD AUTO: 4.7 10E3/UL (ref 4–11)

## 2023-11-17 PROCEDURE — 80061 LIPID PANEL: CPT | Performed by: FAMILY MEDICINE

## 2023-11-17 PROCEDURE — 85025 COMPLETE CBC W/AUTO DIFF WBC: CPT | Performed by: FAMILY MEDICINE

## 2023-11-17 PROCEDURE — G0438 PPPS, INITIAL VISIT: HCPCS | Performed by: FAMILY MEDICINE

## 2023-11-17 PROCEDURE — 80053 COMPREHEN METABOLIC PANEL: CPT | Performed by: FAMILY MEDICINE

## 2023-11-17 PROCEDURE — 84443 ASSAY THYROID STIM HORMONE: CPT | Performed by: FAMILY MEDICINE

## 2023-11-17 PROCEDURE — 36415 COLL VENOUS BLD VENIPUNCTURE: CPT | Performed by: FAMILY MEDICINE

## 2023-11-17 ASSESSMENT — ENCOUNTER SYMPTOMS
EYE PAIN: 0
HEMATOCHEZIA: 0
SHORTNESS OF BREATH: 0
WEAKNESS: 0
CHILLS: 0
FEVER: 0
NAUSEA: 0
PARESTHESIAS: 0
ABDOMINAL PAIN: 0
FREQUENCY: 0
CONSTIPATION: 0
DIZZINESS: 0
JOINT SWELLING: 0
MYALGIAS: 0
HEARTBURN: 0
BREAST MASS: 0
PALPITATIONS: 0
HEADACHES: 0
HEMATURIA: 0
NERVOUS/ANXIOUS: 0
DYSURIA: 0
DIARRHEA: 0
ARTHRALGIAS: 0
COUGH: 0
SORE THROAT: 0

## 2023-11-17 ASSESSMENT — ACTIVITIES OF DAILY LIVING (ADL): CURRENT_FUNCTION: NO ASSISTANCE NEEDED

## 2023-11-17 NOTE — PATIENT INSTRUCTIONS
Patient Education   Personalized Prevention Plan  You are due for the preventive services outlined below.  Your care team is available to assist you in scheduling these services.  If you have already completed any of these items, please share that information with your care team to update in your medical record.  Health Maintenance Due   Topic Date Due     RSV VACCINE (Pregnancy & 60+) (1 - 1-dose 60+ series) Never done     Colorectal Cancer Screening  02/11/2018     COVID-19 Vaccine (6 - 2023-24 season) 09/01/2023     Annual Wellness Visit  11/16/2023

## 2023-12-14 ENCOUNTER — TRANSFERRED RECORDS (OUTPATIENT)
Dept: HEALTH INFORMATION MANAGEMENT | Facility: CLINIC | Age: 68
End: 2023-12-14
Payer: COMMERCIAL

## 2024-01-11 ENCOUNTER — TRANSFERRED RECORDS (OUTPATIENT)
Dept: HEALTH INFORMATION MANAGEMENT | Facility: CLINIC | Age: 69
End: 2024-01-11
Payer: COMMERCIAL

## 2024-03-31 ENCOUNTER — APPOINTMENT (OUTPATIENT)
Dept: RADIOLOGY | Facility: HOSPITAL | Age: 69
End: 2024-03-31
Attending: FAMILY MEDICINE
Payer: COMMERCIAL

## 2024-03-31 ENCOUNTER — HOSPITAL ENCOUNTER (EMERGENCY)
Facility: HOSPITAL | Age: 69
Discharge: HOME | End: 2024-03-31
Attending: FAMILY MEDICINE | Admitting: FAMILY MEDICINE
Payer: COMMERCIAL

## 2024-03-31 VITALS
OXYGEN SATURATION: 98 % | HEIGHT: 67 IN | RESPIRATION RATE: 22 BRPM | TEMPERATURE: 98.3 F | WEIGHT: 153.4 LBS | HEART RATE: 60 BPM | BODY MASS INDEX: 24.08 KG/M2 | SYSTOLIC BLOOD PRESSURE: 127 MMHG | DIASTOLIC BLOOD PRESSURE: 72 MMHG

## 2024-03-31 DIAGNOSIS — I48.91 ATRIAL FIBRILLATION WITH RVR (H): ICD-10-CM

## 2024-03-31 DIAGNOSIS — I51.7 CARDIOMEGALY: ICD-10-CM

## 2024-03-31 DIAGNOSIS — Z87.74 H/O ATRIAL SEPTAL DEFECT REPAIR: ICD-10-CM

## 2024-03-31 LAB
ANION GAP SERPL CALCULATED.3IONS-SCNC: 11 MMOL/L (ref 7–15)
BASOPHILS # BLD AUTO: 0 10E3/UL (ref 0–0.2)
BASOPHILS NFR BLD AUTO: 0 %
BUN SERPL-MCNC: 16.6 MG/DL (ref 8–23)
CALCIUM SERPL-MCNC: 9.5 MG/DL (ref 8.8–10.2)
CHLORIDE SERPL-SCNC: 106 MMOL/L (ref 98–107)
CREAT SERPL-MCNC: 1.04 MG/DL (ref 0.51–0.95)
DEPRECATED HCO3 PLAS-SCNC: 27 MMOL/L (ref 22–29)
EGFRCR SERPLBLD CKD-EPI 2021: 59 ML/MIN/1.73M2
EOSINOPHIL # BLD AUTO: 0.3 10E3/UL (ref 0–0.7)
EOSINOPHIL NFR BLD AUTO: 3 %
ERYTHROCYTE [DISTWIDTH] IN BLOOD BY AUTOMATED COUNT: 12.4 % (ref 10–15)
GLUCOSE SERPL-MCNC: 114 MG/DL (ref 70–99)
HCT VFR BLD AUTO: 46.8 % (ref 35–47)
HGB BLD-MCNC: 15.3 G/DL (ref 11.7–15.7)
HOLD SPECIMEN: NORMAL
IMM GRANULOCYTES # BLD: 0 10E3/UL
IMM GRANULOCYTES NFR BLD: 0 %
LYMPHOCYTES # BLD AUTO: 2.1 10E3/UL (ref 0.8–5.3)
LYMPHOCYTES NFR BLD AUTO: 26 %
MAGNESIUM SERPL-MCNC: 2.3 MG/DL (ref 1.7–2.3)
MCH RBC QN AUTO: 29.3 PG (ref 26.5–33)
MCHC RBC AUTO-ENTMCNC: 32.7 G/DL (ref 31.5–36.5)
MCV RBC AUTO: 90 FL (ref 78–100)
MONOCYTES # BLD AUTO: 0.7 10E3/UL (ref 0–1.3)
MONOCYTES NFR BLD AUTO: 9 %
NEUTROPHILS # BLD AUTO: 4.9 10E3/UL (ref 1.6–8.3)
NEUTROPHILS NFR BLD AUTO: 62 %
NRBC # BLD AUTO: 0 10E3/UL
NRBC BLD AUTO-RTO: 0 /100
NT-PROBNP SERPL-MCNC: 1036 PG/ML (ref 0–900)
PLATELET # BLD AUTO: 282 10E3/UL (ref 150–450)
POTASSIUM SERPL-SCNC: 4.2 MMOL/L (ref 3.4–5.3)
RBC # BLD AUTO: 5.23 10E6/UL (ref 3.8–5.2)
SODIUM SERPL-SCNC: 144 MMOL/L (ref 135–145)
TROPONIN T SERPL HS-MCNC: 7 NG/L
WBC # BLD AUTO: 8 10E3/UL (ref 4–11)

## 2024-03-31 PROCEDURE — 36415 COLL VENOUS BLD VENIPUNCTURE: CPT | Performed by: FAMILY MEDICINE

## 2024-03-31 PROCEDURE — 83880 ASSAY OF NATRIURETIC PEPTIDE: CPT | Performed by: FAMILY MEDICINE

## 2024-03-31 PROCEDURE — 96374 THER/PROPH/DIAG INJ IV PUSH: CPT

## 2024-03-31 PROCEDURE — 83735 ASSAY OF MAGNESIUM: CPT | Performed by: FAMILY MEDICINE

## 2024-03-31 PROCEDURE — 93005 ELECTROCARDIOGRAM TRACING: CPT | Performed by: FAMILY MEDICINE

## 2024-03-31 PROCEDURE — 85025 COMPLETE CBC W/AUTO DIFF WBC: CPT | Performed by: FAMILY MEDICINE

## 2024-03-31 PROCEDURE — 80048 BASIC METABOLIC PNL TOTAL CA: CPT | Performed by: FAMILY MEDICINE

## 2024-03-31 PROCEDURE — 71046 X-RAY EXAM CHEST 2 VIEWS: CPT

## 2024-03-31 PROCEDURE — 250N000011 HC RX IP 250 OP 636: Performed by: FAMILY MEDICINE

## 2024-03-31 PROCEDURE — 84484 ASSAY OF TROPONIN QUANT: CPT | Performed by: FAMILY MEDICINE

## 2024-03-31 PROCEDURE — 258N000003 HC RX IP 258 OP 636: Performed by: FAMILY MEDICINE

## 2024-03-31 PROCEDURE — 96361 HYDRATE IV INFUSION ADD-ON: CPT

## 2024-03-31 PROCEDURE — 250N000013 HC RX MED GY IP 250 OP 250 PS 637: Performed by: FAMILY MEDICINE

## 2024-03-31 PROCEDURE — 99285 EMERGENCY DEPT VISIT HI MDM: CPT | Mod: 25

## 2024-03-31 RX ORDER — DILTIAZEM HYDROCHLORIDE 5 MG/ML
15 INJECTION INTRAVENOUS ONCE
Status: COMPLETED | OUTPATIENT
Start: 2024-03-31 | End: 2024-03-31

## 2024-03-31 RX ORDER — METOPROLOL TARTRATE 1 MG/ML
2.5 INJECTION, SOLUTION INTRAVENOUS EVERY 5 MIN PRN
Status: DISCONTINUED | OUTPATIENT
Start: 2024-03-31 | End: 2024-03-31

## 2024-03-31 RX ORDER — METOPROLOL TARTRATE 25 MG/1
12.5 TABLET, FILM COATED ORAL 2 TIMES DAILY
Qty: 60 TABLET | Refills: 0 | Status: SHIPPED | OUTPATIENT
Start: 2024-03-31 | End: 2024-04-01

## 2024-03-31 RX ADMIN — METOPROLOL TARTRATE 12.5 MG: 25 TABLET, FILM COATED ORAL at 10:55

## 2024-03-31 RX ADMIN — SODIUM CHLORIDE 1000 ML: 9 INJECTION, SOLUTION INTRAVENOUS at 09:22

## 2024-03-31 RX ADMIN — DILTIAZEM HYDROCHLORIDE 15 MG: 5 INJECTION INTRAVENOUS at 09:24

## 2024-03-31 ASSESSMENT — ENCOUNTER SYMPTOMS
SHORTNESS OF BREATH: 0
PALPITATIONS: 1

## 2024-03-31 ASSESSMENT — ACTIVITIES OF DAILY LIVING (ADL)
ADLS_ACUITY_SCORE: 35
ADLS_ACUITY_SCORE: 35

## 2024-03-31 ASSESSMENT — COLUMBIA-SUICIDE SEVERITY RATING SCALE - C-SSRS
2. HAVE YOU ACTUALLY HAD ANY THOUGHTS OF KILLING YOURSELF IN THE PAST MONTH?: NO
6. HAVE YOU EVER DONE ANYTHING, STARTED TO DO ANYTHING, OR PREPARED TO DO ANYTHING TO END YOUR LIFE?: NO
1. IN THE PAST MONTH, HAVE YOU WISHED YOU WERE DEAD OR WISHED YOU COULD GO TO SLEEP AND NOT WAKE UP?: NO

## 2024-03-31 NOTE — DISCHARGE INSTRUCTIONS
Stop atenolol, start metoprolol    Start taking anticoagulation    Follow-up with Dr. Campa or other cardiologist soon as possible.

## 2024-03-31 NOTE — ED PROVIDER NOTES
EMERGENCY DEPARTMENT ENCOUNTER      NAME: Summer Kendrick  AGE: 68 year old female  YOB: 1955  MRN: [id number]  EVALUATION DATE & TIME: No admission date for patient encounter.    PCP: Lorie Ladd    ED PROVIDER: Shant Garg M.D.    Chief Complaint   Patient presents with    Atrial Fib       FINAL IMPRESSION:  1. Atrial fibrillation with RVR (H)    2. Right Ventricular Enlargement    3. H/O atrial septal defect repair        ED COURSE & MEDICAL DECISION MAKING:    Pertinent Labs & Imaging studies independently interpreted by me. (See chart for details)  Reviewed most recent primary care visit November 2023 which was a Medicare wellness exam, patient has a history of atrial fibrillation which at that time was stable, not currently anticoagulated.    ED Course as of 03/31/24 1045   Sun Mar 31, 2024   0916 Patient seen and examined, presents with palpitations and not feeling well, atrial fibrillation since early this morning.  She is not anticoagulated.  Exam here, patient is in atrial fibrillation with rate of 120-150.  No chest pain, shortness of breath, lower extremity swelling, or crackles on exam to suggest heart failure.  Patient notes multiple stressors as well as an upper respiratory infection right now, any of which could be triggering her atrial fibrillation.  Diltiazem IV is given for rate control.  As patient is good historian could consider elective cardioversion today although patient is not anticoagulated.  IV fluids ordered as well.   0939 Labs independently interpreted by me with slightly elevated creatinine which is stable for the patient, magnesium normal, CBC is normal.  After Cardizem IV, heart rate in the 80s but still in atrial fibrillation, blood pressure 95/63 and IV fluids are infusing.   0949 Labs independently interpreted by me with minimally elevated troponin which likely represents strain from atrial fibrillation/flutter, BNP slightly elevated.  On  the monitor, patient continues to be in atrial fibrillation with occasional runs of what looks like atrial flutter but no ventricular dysrhythmia.   1010 Chest x-ray independently interpreted by me does not demonstrate any cardiomegaly, effusion or infiltrate.  Heart rate has increased back up to the 120s.  Will discuss further rate control with cardiology.   1021 Patient with findings so far and plan.  She indicates she would be interested in electrical cardioversion if that was felt to be an option today   1039 Telemetry shows patient has reverted to sinus rhythm rate in the 50s care discussed with Dr. Mims, cardiology.  Recommends changing atenolol to metoprolol and initiating anticoagulation, follow-up with cardiology clinic         At the conclusion of the encounter I discussed the results of all of the tests and the disposition. The questions were answered. The patient or family acknowledged understanding and was agreeable with the care plan.     Medical Decision Making  Obtained supplemental history:Supplemental history obtained?: Family Member/Significant Other  Reviewed external records: External records reviewed?: Documented in chart  Care impacted by chronic illness:Other: ASD repari  Care significantly affected by social determinants of health:Access to Affordable Health Care  Did you consider but not order tests?: Work up considered but not performed and documented in chart, if applicable  Did you interpret images independently?: Independent interpretation of ECG and images noted in documentation, when applicable.  Consultation discussion with other provider:Did you involve another provider (consultant, , pharmacy, etc.)?: I discussed the care with another health care provider, see documentation for details.  Discharge. I prescribed additional prescription strength medication(s) as charted. I considered admission, but discharged patient after significant clinical improvement.    EKG:    Performed at:  9:02 AM  Impression: Atrial flutter  Rate: 138  Rhythm: Atrial flutter  Axis: Normal  QRS Interval: 114  QTc Interval: 536  ST Changes: No acute ischemic changes, nonspecific ST changes  Comparison: October 2023, atrial flutter has replaced sinus rhythm    Performed at: 9:15 AM  Impression: Atrial fibrillation  Rate: 122  Rhythm: Atrial fibrillation  Axis: Normal  QRS Interval: 80  QTc Interval: 421  ST Changes: No acute ischemic changes  Comparison: Prior of earlier today, atrial fibrillation has replaced atrial flutter    Performed at: 10:32 AM  Impression: Normal EKG  Rate: 62  Rhythm: Sinus  Axis: Right  NC Interval: 178  QRS Interval: 82  QTc Interval: 420  ST Changes: No acute ischemic changes  Comparison: Prior of earlier today, sinus rhythm has replaced atrial fibrillation flutter    I have independently reviewed and interpreted the EKG(s) documented above.    PROCEDURES:       MEDICATIONS GIVEN IN THE EMERGENCY:  Medications   metoprolol tartrate (LOPRESSOR) half-tab 12.5 mg (has no administration in time range)   sodium chloride 0.9% BOLUS 1,000 mL (1,000 mLs Intravenous $New Bag 3/31/24 0922)   diltiazem (CARDIZEM) injection 15 mg (15 mg Intravenous $Given 3/31/24 0924)       NEW PRESCRIPTIONS STARTED AT TODAY'S ER VISIT  New Prescriptions    METOPROLOL TARTRATE (LOPRESSOR) 25 MG TABLET    Take 0.5 tablets (12.5 mg) by mouth 2 times daily    RIVAROXABAN ANTICOAGULANT (XARELTO) 20 MG TABS TABLET    Take 1 tablet (20 mg) by mouth daily (with dinner)       =================================================================    HPI    Patient information was obtained from: Patient and patient's family member      Summer Kendrick is a 68 year old female with a pertinent history of atrial fibrillation (1x) after a patch closure of ASD in 2014 who presents to this ED by private vehicle for evaluation of palpitations.     The patient endorses palpations since 2:30 AM this morning and checked her Fitbit to find that  she was in atrial fibrillation. She denies any chest pain, shortness of breath or leg swelling. Patient notes increased stressors in her life, but states that she is not on blood thinners. In the past, she saw Dr. Campa, cardiology. When she last was in atrial fibrillation in 2014, patient states she was given amiodarone. No other complaints at this time.       REVIEW OF SYSTEMS   Review of Systems   Respiratory:  Negative for shortness of breath.    Cardiovascular:  Positive for palpitations. Negative for chest pain and leg swelling.   All other systems reviewed and are negative.     All other systems reviewed and negative    PAST MEDICAL HISTORY:  History reviewed. No pertinent past medical history.    PAST SURGICAL HISTORY:  Past Surgical History:   Procedure Laterality Date    ZZC REPR ASD W BYPASS      Description: Repair Of Atrial Secundum Septal Defect;  Recorded: 06/03/2014;       CURRENT MEDICATIONS:    Current Facility-Administered Medications   Medication    metoprolol tartrate (LOPRESSOR) half-tab 12.5 mg     Current Outpatient Medications   Medication    metoprolol tartrate (LOPRESSOR) 25 MG tablet    rivaroxaban ANTICOAGULANT (XARELTO) 20 MG TABS tablet    atenolol (TENORMIN) 25 MG tablet    b complex vitamins tablet    cholecalciferol (VITAMIN D3) 10 mcg (400 units) TABS tablet    magnesium 250 MG tablet       ALLERGIES:  Allergies   Allergen Reactions    Nickel Unknown    Penicillins Rash       FAMILY HISTORY:  Family History   Problem Relation Age of Onset    Breast Cancer Mother 55.00       SOCIAL HISTORY:   Social History     Socioeconomic History    Marital status:    Tobacco Use    Smoking status: Never    Smokeless tobacco: Never   Substance and Sexual Activity    Alcohol use: Yes     Alcohol/week: 1.0 standard drink of alcohol     Comment: Alcoholic Drinks/day: Ocasional    Drug use: No    Sexual activity: Yes     Partners: Male     Social Determinants of Health     Financial Resource  "Strain: Low Risk  (11/17/2023)    Financial Resource Strain     Within the past 12 months, have you or your family members you live with been unable to get utilities (heat, electricity) when it was really needed?: No   Food Insecurity: Low Risk  (11/17/2023)    Food Insecurity     Within the past 12 months, did you worry that your food would run out before you got money to buy more?: No     Within the past 12 months, did the food you bought just not last and you didn t have money to get more?: No   Transportation Needs: Low Risk  (11/17/2023)    Transportation Needs     Within the past 12 months, has lack of transportation kept you from medical appointments, getting your medicines, non-medical meetings or appointments, work, or from getting things that you need?: No   Interpersonal Safety: Low Risk  (11/17/2023)    Interpersonal Safety     Do you feel physically and emotionally safe where you currently live?: Yes     Within the past 12 months, have you been hit, slapped, kicked or otherwise physically hurt by someone?: No     Within the past 12 months, have you been humiliated or emotionally abused in other ways by your partner or ex-partner?: No   Housing Stability: Low Risk  (11/17/2023)    Housing Stability     Do you have housing? : Yes     Are you worried about losing your housing?: No       VITALS:  BP 95/63   Pulse 83   Temp 98.3  F (36.8  C) (Oral)   Resp 12   Ht 1.702 m (5' 7\")   Wt 69.6 kg (153 lb 6.4 oz)   LMP  (LMP Unknown)   SpO2 97%   BMI 24.03 kg/m      PHYSICAL EXAM:  Physical Exam  Vitals and nursing note reviewed.   Constitutional:       Appearance: Normal appearance.   HENT:      Head: Normocephalic and atraumatic.      Right Ear: External ear normal.      Left Ear: External ear normal.      Nose: Nose normal.      Mouth/Throat:      Mouth: Mucous membranes are moist.   Eyes:      Extraocular Movements: Extraocular movements intact.      Conjunctiva/sclera: Conjunctivae normal.      " Pupils: Pupils are equal, round, and reactive to light.   Cardiovascular:      Rate and Rhythm: Tachycardia present. Rhythm irregular.   Pulmonary:      Effort: Pulmonary effort is normal.      Breath sounds: Normal breath sounds. No wheezing or rales.   Abdominal:      General: Abdomen is flat. There is no distension.      Palpations: Abdomen is soft.      Tenderness: There is no abdominal tenderness. There is no guarding.   Musculoskeletal:         General: Normal range of motion.      Cervical back: Normal range of motion and neck supple.      Right lower leg: No edema.      Left lower leg: No edema.   Lymphadenopathy:      Cervical: No cervical adenopathy.   Skin:     General: Skin is warm and dry.   Neurological:      General: No focal deficit present.      Mental Status: She is alert and oriented to person, place, and time. Mental status is at baseline.      Comments: No gross focal neurologic deficits   Psychiatric:         Mood and Affect: Mood normal.         Behavior: Behavior normal.         Thought Content: Thought content normal.          LAB:  All pertinent labs reviewed and interpreted.  Results for orders placed or performed during the hospital encounter of 03/31/24   Chest XR,  PA & LAT    Impression    IMPRESSION: Sternotomy. Chest otherwise negative. Lungs are clear.   Extra Blue Top Tube   Result Value Ref Range    Hold Specimen JIC    Extra Red Top Tube   Result Value Ref Range    Hold Specimen JIC    Extra Green Top (Lithium Heparin) Tube   Result Value Ref Range    Hold Specimen JIC    Extra Purple Top Tube   Result Value Ref Range    Hold Specimen JIC    Basic metabolic panel   Result Value Ref Range    Sodium 144 135 - 145 mmol/L    Potassium 4.2 3.4 - 5.3 mmol/L    Chloride 106 98 - 107 mmol/L    Carbon Dioxide (CO2) 27 22 - 29 mmol/L    Anion Gap 11 7 - 15 mmol/L    Urea Nitrogen 16.6 8.0 - 23.0 mg/dL    Creatinine 1.04 (H) 0.51 - 0.95 mg/dL    GFR Estimate 59 (L) >60 mL/min/1.73m2     Calcium 9.5 8.8 - 10.2 mg/dL    Glucose 114 (H) 70 - 99 mg/dL   Result Value Ref Range    Magnesium 2.3 1.7 - 2.3 mg/dL   Nt probnp inpatient (BNP)   Result Value Ref Range    N terminal Pro BNP Inpatient 1,036 (H) 0 - 900 pg/mL   Result Value Ref Range    Troponin T, High Sensitivity 7 <=14 ng/L   CBC with platelets and differential   Result Value Ref Range    WBC Count 8.0 4.0 - 11.0 10e3/uL    RBC Count 5.23 (H) 3.80 - 5.20 10e6/uL    Hemoglobin 15.3 11.7 - 15.7 g/dL    Hematocrit 46.8 35.0 - 47.0 %    MCV 90 78 - 100 fL    MCH 29.3 26.5 - 33.0 pg    MCHC 32.7 31.5 - 36.5 g/dL    RDW 12.4 10.0 - 15.0 %    Platelet Count 282 150 - 450 10e3/uL    % Neutrophils 62 %    % Lymphocytes 26 %    % Monocytes 9 %    % Eosinophils 3 %    % Basophils 0 %    % Immature Granulocytes 0 %    NRBCs per 100 WBC 0 <1 /100    Absolute Neutrophils 4.9 1.6 - 8.3 10e3/uL    Absolute Lymphocytes 2.1 0.8 - 5.3 10e3/uL    Absolute Monocytes 0.7 0.0 - 1.3 10e3/uL    Absolute Eosinophils 0.3 0.0 - 0.7 10e3/uL    Absolute Basophils 0.0 0.0 - 0.2 10e3/uL    Absolute Immature Granulocytes 0.0 <=0.4 10e3/uL    Absolute NRBCs 0.0 10e3/uL       RADIOLOGY:  Reviewed all pertinent imaging. Please see official radiology report.  Chest XR,  PA & LAT   Final Result   IMPRESSION: Sternotomy. Chest otherwise negative. Lungs are clear.          I, Zainab Angulo, am serving as a scribe to document services personally performed by Dr. Garg based on my observation and the provider's statements to me. I, Shant Garg MD attest that Zainab Angulo is acting in a scribe capacity, has observed my performance of the services and has documented them in accordance with my direction.    Shant Garg M.D.  Emergency Medicine  [facility] EMERGENCY DEPARTMENT  [address] [place] [id number]  [phone]  Dept: [phone]       Shant Garg MD  03/31/24 1045

## 2024-03-31 NOTE — ED TRIAGE NOTES
Amb to triage after EKG in triage.  Pt reports at 0200 carmel notified her that she was in A-fib.  Pt states about 10 yrs ago was in hospital for heart surgery and had an episode of this and took amioderone for about 1 month with relief.  Pt denies CP or SOB.  States can feel flutter in chest intermittently.       Triage Assessment (Adult)       Row Name 03/31/24 0910          Triage Assessment    Airway WDL WDL        Respiratory WDL    Respiratory WDL WDL        Skin Circulation/Temperature WDL    Skin Circulation/Temperature WDL WDL        Cardiac WDL    Cardiac WDL X;all  FEELING fluttering intermittently     Pulse Rate & Regularity tachycardic        Peripheral/Neurovascular WDL    Peripheral Neurovascular WDL WDL        Cognitive/Neuro/Behavioral WDL    Cognitive/Neuro/Behavioral WDL WDL

## 2024-04-01 ENCOUNTER — OFFICE VISIT (OUTPATIENT)
Dept: CARDIOLOGY | Facility: CLINIC | Age: 69
End: 2024-04-01
Payer: COMMERCIAL

## 2024-04-01 VITALS
OXYGEN SATURATION: 93 % | BODY MASS INDEX: 24.01 KG/M2 | DIASTOLIC BLOOD PRESSURE: 80 MMHG | RESPIRATION RATE: 20 BRPM | WEIGHT: 153.3 LBS | HEART RATE: 68 BPM | SYSTOLIC BLOOD PRESSURE: 144 MMHG

## 2024-04-01 DIAGNOSIS — I51.7 CARDIOMEGALY: ICD-10-CM

## 2024-04-01 DIAGNOSIS — Q21.11 OSTIUM SECUNDUM TYPE ATRIAL SEPTAL DEFECT: ICD-10-CM

## 2024-04-01 DIAGNOSIS — I48.91 ATRIAL FIBRILLATION WITH RVR (H): Primary | ICD-10-CM

## 2024-04-01 PROCEDURE — G2211 COMPLEX E/M VISIT ADD ON: HCPCS | Performed by: STUDENT IN AN ORGANIZED HEALTH CARE EDUCATION/TRAINING PROGRAM

## 2024-04-01 PROCEDURE — 99204 OFFICE O/P NEW MOD 45 MIN: CPT | Performed by: STUDENT IN AN ORGANIZED HEALTH CARE EDUCATION/TRAINING PROGRAM

## 2024-04-01 RX ORDER — METOPROLOL TARTRATE 25 MG/1
12.5 TABLET, FILM COATED ORAL 2 TIMES DAILY
Qty: 90 TABLET | Refills: 3 | Status: SHIPPED | OUTPATIENT
Start: 2024-04-01 | End: 2024-04-03

## 2024-04-01 RX ORDER — CETIRIZINE HYDROCHLORIDE 10 MG/1
10 TABLET ORAL DAILY
COMMUNITY
Start: 2024-01-11

## 2024-04-01 NOTE — PROGRESS NOTES
[facility] HEART CARE   [address]  [place] [address]   [email or web address]   OFFICE: [phone]     CARDIOLOGY RAPID ACCESS CLINIC NOTE     Thank you, Dr. Ladd, Lorie DOLAN, for asking the [facility] Heart Care team to see Ms. Summer Kendrick to evaluate afib      History of Present Illness   Ms. Summer Kendrick is a 68 year old female with a significant past history of secundum ASD s/p surgical repair 2014, paroxysmal afib, RV enlargement,  who presents for evaluation of atrial fibrillation.  The patient notes she woke up to feeling dizzy and with irregular heart beat.  Her fitbit noted start of afib around 230 AM.  The pt went to the ED and vidhya resolved with some diltiazem.  Atenolol was switched to metoprolol and pt was started on xarelto.  The pt notes being tired today but otherwise feels ok.  She denies JOSEPHINE type symptoms.      Other than noted above, Ms. Kendrick denies any chest pain/pressure/tightness, shortness of breath at rest or with exertion, light headedness/dizziness, pre-syncope, syncope, lower extremity swelling, paroxysmal nocturnal dyspnea (PND), or orthopnea.       Medications  Allergies   Current Outpatient Medications   Medication Sig Dispense Refill    cetirizine (ZYRTEC ALLERGY) 10 MG tablet Take 10 mg by mouth daily      cholecalciferol (VITAMIN D3) 10 mcg (400 units) TABS tablet       metoprolol tartrate (LOPRESSOR) 25 MG tablet Take 0.5 tablets (12.5 mg) by mouth 2 times daily 90 tablet 3    rivaroxaban ANTICOAGULANT (XARELTO) 20 MG TABS tablet Take 1 tablet (20 mg) by mouth daily (with dinner) 90 tablet 3      Allergies   Allergen Reactions    Nickel Unknown    Penicillins Rash        Physical Examination Review of Systems   Vitals: BP (!) 144/80 (BP Location: Left arm, Patient Position: Sitting, Cuff Size: Adult Regular)   Pulse 68   Resp 20   Wt 69.5 kg (153 lb 4.8 oz)   LMP  (LMP Unknown)   SpO2 93%   BMI 24.01 kg/m     BMI= Body mass index is 24.01 kg/m .  Wt Readings from Last 3 Encounters:   04/01/24 69.5 kg (153 lb 4.8 oz)   03/31/24 69.6 kg (153 lb 6.4 oz)   11/17/23 69.4 kg (153 lb)       General: pleasant female. No acute distress.   Neck: No JVD  Lungs: clear to auscultation  COR:  regular rate and rhythm, No murmurs, rubs, or gallops  Extrem: No edema        Please refer above for cardiac ROS details.       Past History     Family History:   Family History   Problem Relation Age of Onset    Breast Cancer Mother 55.00        Social History:   Social History     Socioeconomic History    Marital status:      Spouse name: Not on file    Number of children: Not on file    Years of education: Not on file    Highest education level: Not on file   Occupational History    Not on file   Tobacco Use    Smoking status: Never    Smokeless tobacco: Never   Substance and Sexual Activity    Alcohol use: Yes     Alcohol/week: 1.0 standard drink of alcohol     Comment: Alcoholic Drinks/day: Ocasional    Drug use: No    Sexual activity: Yes     Partners: Male   Other Topics Concern    Not on file   Social History Narrative    Not on file     Social Determinants of Health     Financial Resource Strain: Low Risk  (11/17/2023)    Financial Resource Strain     Within the past 12 months, have you or your family members you live with been unable to get utilities (heat, electricity) when it was really needed?: No   Food Insecurity: Low Risk  (11/17/2023)    Food Insecurity     Within the past 12 months, did you worry that your food would run out before you got money to buy more?: No     Within the past 12 months, did the food you bought just not last and you didn t have money to get more?: No   Transportation Needs: Low Risk  (11/17/2023)    Transportation Needs     Within the past 12 months, has lack of transportation kept you from medical appointments, getting your medicines, non-medical meetings or appointments, work, or from getting  things that you need?: No   Physical Activity: Not on file   Stress: Not on file   Social Connections: Not on file   Interpersonal Safety: Low Risk  (11/17/2023)    Interpersonal Safety     Do you feel physically and emotionally safe where you currently live?: Yes     Within the past 12 months, have you been hit, slapped, kicked or otherwise physically hurt by someone?: No     Within the past 12 months, have you been humiliated or emotionally abused in other ways by your partner or ex-partner?: No   Housing Stability: Low Risk  (11/17/2023)    Housing Stability     Do you have housing? : Yes     Are you worried about losing your housing?: No            Lab Results    Chemistry/lipid CBC Cardiac Enzymes/BNP/TSH/INR   Lab Results   Component Value Date    CHOL 204 (H) 11/17/2023    HDL 69 11/17/2023    TRIG 121 11/17/2023    BUN 16.6 03/31/2024     03/31/2024    CO2 27 03/31/2024    Lab Results   Component Value Date    WBC 8.0 03/31/2024    HGB 15.3 03/31/2024    HCT 46.8 03/31/2024    MCV 90 03/31/2024     03/31/2024    Lab Results   Component Value Date    TSH 3.08 11/17/2023          Cardiac Problems and Cardiac Diagnostics     Most Recent Cardiac testing:  ECG Personal interpretation  3/31/2024  NSR  R axis deviation  Poor R wave    3/31/2024  Afib with rVR with premature or aberrantly conducted complexes  R axis    ECHO 8/25/2014   Summary   Normal left ventricular size and systolic function.   Left ventricular ejection fraction is visually estimated to be 60%.   Right ventricular systolic function is normal.   Moderately enlarged right ventricle.   Moderately enlarged right atrium.   Injection of agitated saline documents no interatrial shunt.   Atrial septal defect patch noted.   This study was compared with a previously done echocardiogram 5/1/2014.   The results are similar.         Assessment/Recommendations   Assessment:    Ms. Summer Kendrick is a 68 year old female with a significant past  history of secundum ASD s/p surgical repair 2014, paroxysmal afib, RV enlargement,  who presents for evaluation of atrial fibrillation.      Paroxysmal Afib   - PT with hx of post op afib in 2014 after ASD repair.  We discussed that her hx of ASD repair and RA enlargement may contributed to her development of afib   - Continue metoprolol tartrate 12.5mg BID.  Resting HR low 60s   - CHADS-Vasc 2-3 (unclear if HTN although was on atenolol), continue xarelto 20mg daily   - Monitor afib burden with smart watch to determine need for rhythm control strategy   - TTE   - Caffeine in moderation, alcohol in moderation   - May exercise but do not over-exert    Secundum ASD s/p surgical repair 2014   - TTE    RTC 6 months         Elmo Mcclure DO [facility]  Non-invasive Cardiologist  [facility]

## 2024-04-01 NOTE — PATIENT INSTRUCTIONS
It was a pleasure meeting you today    In summary:    We will get an echocardiogram to evaluate the heart function.  We will continue the metoprolol and xarelto for the afib.  Please call the office if you have any issues.    Please call my nurse Zbigniew Davis at [phone] if you have any questions or issues.    We will schedule a follow up visit in  6 months      Elmo Mcclure DO [facility]  Non-invasive Cardiologist  [facility]

## 2024-04-01 NOTE — LETTER
4/1/2024    LORIE LADD MD  [address] [facility] [address]    RE: Summer Kendrick       Dear Colleague,     I had the pleasure of seeing Summer Kendrick in the [facility].    [facility]   [address]  [place] [address]   [email or web address]   OFFICE: [phone]     CARDIOLOGY RAPID ACCESS CLINIC NOTE     Thank you, Dr. Ladd, Lorie DOLAN, for asking the [facility] team to see Ms. Summer Kendrick to evaluate afib      History of Present Illness   Ms. Summer Kendrick is a 68 year old female with a significant past history of secundum ASD s/p surgical repair 2014, paroxysmal afib, RV enlargement,  who presents for evaluation of atrial fibrillation.  The patient notes she woke up to feeling dizzy and with irregular heart beat.  Her fitbit noted start of afib around 230 AM.  The pt went to the ED and vidhya resolved with some diltiazem.  Atenolol was switched to metoprolol and pt was started on xarelto.  The pt notes being tired today but otherwise feels ok.  She denies JOSEPHINE type symptoms.      Other than noted above, Ms. Kendrikc denies any chest pain/pressure/tightness, shortness of breath at rest or with exertion, light headedness/dizziness, pre-syncope, syncope, lower extremity swelling, paroxysmal nocturnal dyspnea (PND), or orthopnea.       Medications  Allergies   Current Outpatient Medications   Medication Sig Dispense Refill    cetirizine (ZYRTEC ALLERGY) 10 MG tablet Take 10 mg by mouth daily      cholecalciferol (VITAMIN D3) 10 mcg (400 units) TABS tablet       metoprolol tartrate (LOPRESSOR) 25 MG tablet Take 0.5 tablets (12.5 mg) by mouth 2 times daily 90 tablet 3    rivaroxaban ANTICOAGULANT (XARELTO) 20 MG TABS tablet Take 1 tablet (20 mg) by mouth daily (with dinner) 90 tablet 3      Allergies   Allergen Reactions    Nickel Unknown    Penicillins Rash        Physical Examination Review of Systems    Vitals: BP (!) 144/80 (BP Location: Left arm, Patient Position: Sitting, Cuff Size: Adult Regular)   Pulse 68   Resp 20   Wt 69.5 kg (153 lb 4.8 oz)   LMP  (LMP Unknown)   SpO2 93%   BMI 24.01 kg/m    BMI= Body mass index is 24.01 kg/m .  Wt Readings from Last 3 Encounters:   04/01/24 69.5 kg (153 lb 4.8 oz)   03/31/24 69.6 kg (153 lb 6.4 oz)   11/17/23 69.4 kg (153 lb)       General: pleasant female. No acute distress.   Neck: No JVD  Lungs: clear to auscultation  COR:  regular rate and rhythm, No murmurs, rubs, or gallops  Extrem: No edema        Please refer above for cardiac ROS details.       Past History     Family History:   Family History   Problem Relation Age of Onset    Breast Cancer Mother 55.00        Social History:   Social History     Socioeconomic History    Marital status:      Spouse name: Not on file    Number of children: Not on file    Years of education: Not on file    Highest education level: Not on file   Occupational History    Not on file   Tobacco Use    Smoking status: Never    Smokeless tobacco: Never   Substance and Sexual Activity    Alcohol use: Yes     Alcohol/week: 1.0 standard drink of alcohol     Comment: Alcoholic Drinks/day: Ocasional    Drug use: No    Sexual activity: Yes     Partners: Male   Other Topics Concern    Not on file   Social History Narrative    Not on file     Social Determinants of Health     Financial Resource Strain: Low Risk  (11/17/2023)    Financial Resource Strain     Within the past 12 months, have you or your family members you live with been unable to get utilities (heat, electricity) when it was really needed?: No   Food Insecurity: Low Risk  (11/17/2023)    Food Insecurity     Within the past 12 months, did you worry that your food would run out before you got money to buy more?: No     Within the past 12 months, did the food you bought just not last and you didn t have money to get more?: No   Transportation Needs: Low Risk   (11/17/2023)    Transportation Needs     Within the past 12 months, has lack of transportation kept you from medical appointments, getting your medicines, non-medical meetings or appointments, work, or from getting things that you need?: No   Physical Activity: Not on file   Stress: Not on file   Social Connections: Not on file   Interpersonal Safety: Low Risk  (11/17/2023)    Interpersonal Safety     Do you feel physically and emotionally safe where you currently live?: Yes     Within the past 12 months, have you been hit, slapped, kicked or otherwise physically hurt by someone?: No     Within the past 12 months, have you been humiliated or emotionally abused in other ways by your partner or ex-partner?: No   Housing Stability: Low Risk  (11/17/2023)    Housing Stability     Do you have housing? : Yes     Are you worried about losing your housing?: No            Lab Results    Chemistry/lipid CBC Cardiac Enzymes/BNP/TSH/INR   Lab Results   Component Value Date    CHOL 204 (H) 11/17/2023    HDL 69 11/17/2023    TRIG 121 11/17/2023    BUN 16.6 03/31/2024     03/31/2024    CO2 27 03/31/2024    Lab Results   Component Value Date    WBC 8.0 03/31/2024    HGB 15.3 03/31/2024    HCT 46.8 03/31/2024    MCV 90 03/31/2024     03/31/2024    Lab Results   Component Value Date    TSH 3.08 11/17/2023          Cardiac Problems and Cardiac Diagnostics     Most Recent Cardiac testing:  ECG Personal interpretation  3/31/2024  NSR  R axis deviation  Poor R wave    3/31/2024  Afib with rVR with premature or aberrantly conducted complexes  R axis    ECHO 8/25/2014   Summary   Normal left ventricular size and systolic function.   Left ventricular ejection fraction is visually estimated to be 60%.   Right ventricular systolic function is normal.   Moderately enlarged right ventricle.   Moderately enlarged right atrium.   Injection of agitated saline documents no interatrial shunt.   Atrial septal defect patch noted.   This  study was compared with a previously done echocardiogram 5/1/2014.   The results are similar.         Assessment/Recommendations   Assessment:    Ms. Summer Kendrick is a 68 year old female with a significant past history of secundum ASD s/p surgical repair 2014, paroxysmal afib, RV enlargement,  who presents for evaluation of atrial fibrillation.      Paroxysmal Afib   - PT with hx of post op afib in 2014 after ASD repair.  We discussed that her hx of ASD repair and RA enlargement may contributed to her development of afib   - Continue metoprolol tartrate 12.5mg BID.  Resting HR low 60s   - CHADS-Vasc 2-3 (unclear if HTN although was on atenolol), continue xarelto 20mg daily   - Monitor afib burden with smart watch to determine need for rhythm control strategy   - TTE   - Caffeine in moderation, alcohol in moderation   - May exercise but do not over-exert    Secundum ASD s/p surgical repair 2014   - TTE    RTC 6 months         Elmo Mcclure DO [facility]  Non-invasive Cardiologist  [facility]         Thank you for allowing me to participate in the care of your patient.      Sincerely,     Elmo Mcclure DO     [facility]  cc:   Shant Garg MD  [address] [place] [address]

## 2024-04-02 ENCOUNTER — MYC MEDICAL ADVICE (OUTPATIENT)
Dept: FAMILY MEDICINE | Facility: CLINIC | Age: 69
End: 2024-04-02
Payer: COMMERCIAL

## 2024-04-03 ENCOUNTER — MYC MEDICAL ADVICE (OUTPATIENT)
Dept: CARDIOLOGY | Facility: CLINIC | Age: 69
End: 2024-04-03
Payer: COMMERCIAL

## 2024-04-03 DIAGNOSIS — I48.91 ATRIAL FIBRILLATION WITH RVR (H): ICD-10-CM

## 2024-04-03 LAB
ATRIAL RATE - MUSE: 105 BPM
ATRIAL RATE - MUSE: 138 BPM
ATRIAL RATE - MUSE: 62 BPM
DIASTOLIC BLOOD PRESSURE - MUSE: 77 MMHG
DIASTOLIC BLOOD PRESSURE - MUSE: 91 MMHG
DIASTOLIC BLOOD PRESSURE - MUSE: NORMAL MMHG
INTERPRETATION ECG - MUSE: NORMAL
P AXIS - MUSE: 74 DEGREES
P AXIS - MUSE: NORMAL DEGREES
P AXIS - MUSE: NORMAL DEGREES
PR INTERVAL - MUSE: 178 MS
PR INTERVAL - MUSE: NORMAL MS
PR INTERVAL - MUSE: NORMAL MS
QRS DURATION - MUSE: 114 MS
QRS DURATION - MUSE: 80 MS
QRS DURATION - MUSE: 82 MS
QT - MUSE: 296 MS
QT - MUSE: 354 MS
QT - MUSE: 414 MS
QTC - MUSE: 420 MS
QTC - MUSE: 421 MS
QTC - MUSE: 536 MS
R AXIS - MUSE: 89 DEGREES
R AXIS - MUSE: 93 DEGREES
R AXIS - MUSE: 96 DEGREES
SYSTOLIC BLOOD PRESSURE - MUSE: 114 MMHG
SYSTOLIC BLOOD PRESSURE - MUSE: 124 MMHG
SYSTOLIC BLOOD PRESSURE - MUSE: NORMAL MMHG
T AXIS - MUSE: -41 DEGREES
T AXIS - MUSE: 66 DEGREES
T AXIS - MUSE: 74 DEGREES
VENTRICULAR RATE- MUSE: 122 BPM
VENTRICULAR RATE- MUSE: 138 BPM
VENTRICULAR RATE- MUSE: 62 BPM

## 2024-04-03 RX ORDER — METOPROLOL TARTRATE 25 MG/1
25 TABLET, FILM COATED ORAL 2 TIMES DAILY
Qty: 180 TABLET | Refills: 2 | Status: SHIPPED | OUTPATIENT
Start: 2024-04-03

## 2024-04-03 NOTE — TELEPHONE ENCOUNTER
Cesar Wheeler,    The increase in resting heart rate by itself is not an issue, however may indicate that we need a higher dose of metoprolol to get adequate heart rate slowing of afib.  Let's have you increase the metoprolol to 25mg twice a day.  Let me know how that goes    Dr. Mcclure

## 2024-04-10 ENCOUNTER — OFFICE VISIT (OUTPATIENT)
Dept: FAMILY MEDICINE | Facility: CLINIC | Age: 69
End: 2024-04-10
Payer: COMMERCIAL

## 2024-04-10 VITALS
TEMPERATURE: 98.2 F | SYSTOLIC BLOOD PRESSURE: 153 MMHG | RESPIRATION RATE: 18 BRPM | BODY MASS INDEX: 23.86 KG/M2 | HEIGHT: 67 IN | WEIGHT: 152 LBS | OXYGEN SATURATION: 97 % | HEART RATE: 91 BPM | DIASTOLIC BLOOD PRESSURE: 92 MMHG

## 2024-04-10 DIAGNOSIS — I48.91 ATRIAL FIBRILLATION, UNSPECIFIED TYPE (H): Primary | ICD-10-CM

## 2024-04-10 DIAGNOSIS — Q21.11 OSTIUM SECUNDUM TYPE ATRIAL SEPTAL DEFECT: ICD-10-CM

## 2024-04-10 PROCEDURE — 99213 OFFICE O/P EST LOW 20 MIN: CPT | Performed by: FAMILY MEDICINE

## 2024-04-10 RX ORDER — RESPIRATORY SYNCYTIAL VIRUS VACCINE 120MCG/0.5
0.5 KIT INTRAMUSCULAR ONCE
Qty: 1 EACH | Refills: 0 | Status: CANCELLED | OUTPATIENT
Start: 2024-04-10 | End: 2024-04-10

## 2024-04-10 NOTE — PROGRESS NOTES
1. Atrial fibrillation, unspecified type (H)  2. Atrial Septal Defect  This is a 67 yo female with recent ER visit for atrial fibrillation.  Has been started on Xarelto.  Tolerating okay (and able to obtain it).  Has switched to metoprolol (from Atenolol) as well.  Doing well - was in atrial fib at visit to ER, but had spontaneous conversion back to sinus rhythm.  She is concerned by the fact that she is having trouble with this despite being healthy/ with good health habits.  We have discussed this today - answered all her questions      I have reviewed available ER records,  notes, as well as imaging and lab results.  In addition I have reviewed the medication list and made any adjustments as indicated in orders.        Oksana Wheeler is a 68 year old, presenting for the following health issues:  Hospital F/U        4/10/2024     2:57 PM   Additional Questions   Roomed by Tia     Switched to Metoprolol    Had a fib - woke up with that   Since then    Had virus,   Lost her voice  Inlaws on farm are spiraling - on hospice  Shoveled whole driveway - felt good doing it  All in 1 week     Was symptomatic -   Woke up and didn't feel well  Felt light headed  No              Hospital Follow-up Visit:    Hospital/Nursing Home/IP Rehab Facility: [facility]  Date of Admission: 03/31/2024  Date of Discharge: 03/31/2024  Reason(s) for Admission: Atrial fib   Was the patient in the ICU or did the patient experience delirium during hospitalization?  No  Do you have any other stressors you would like to discuss with your provider? No    Problems taking medications regularly:  None  Medication changes since discharge: New Prescriptions     METOPROLOL TARTRATE (LOPRESSOR) 25 MG TABLET     Take 0.5 tablets (12.5 mg) by mouth 2 times daily     RIVAROXABAN ANTICOAGULANT (XARELTO) 20 MG TABS TABLET     Take 1 tablet (20 mg) by mouth daily (with dinner)      Problems adhering to non-medication therapy:   "None    Summary of hospitalization:  [facility] discharge summary reviewed  Diagnostic Tests/Treatments reviewed.  Follow up needed: none  Other Healthcare Providers Involved in Patient s Care:         None  Update since discharge: improved.         Plan of care communicated with patient                 Review of Systems   Constitutional:  Negative for chills and fever.   HENT:  Negative for ear pain and sore throat.    Eyes:  Negative for pain and visual disturbance.   Respiratory:  Negative for cough and shortness of breath.    Cardiovascular:  Positive for palpitations (resolved). Negative for chest pain.   Gastrointestinal:  Negative for abdominal pain and vomiting.   Genitourinary:  Negative for dysuria and hematuria.   Musculoskeletal:  Negative for arthralgias and back pain.   Skin:  Negative for color change and rash.   Neurological:  Negative for seizures and syncope.   All other systems reviewed and are negative.          Objective    BP (!) 153/92   Pulse 91   Temp 98.2  F (36.8  C) (Temporal)   Resp 18   Ht 1.702 m (5' 7\")   Wt 68.9 kg (152 lb)   LMP  (LMP Unknown)   SpO2 97%   BMI 23.81 kg/m    Body mass index is 23.81 kg/m .  Physical Exam  Vitals reviewed.   Constitutional:       General: She is not in acute distress.     Appearance: Normal appearance.   HENT:      Head: Normocephalic.      Right Ear: Tympanic membrane, ear canal and external ear normal.      Left Ear: Tympanic membrane, ear canal and external ear normal.      Nose: Nose normal.      Mouth/Throat:      Mouth: Mucous membranes are moist.      Pharynx: No posterior oropharyngeal erythema.   Eyes:      Extraocular Movements: Extraocular movements intact.      Conjunctiva/sclera: Conjunctivae normal.      Pupils: Pupils are equal, round, and reactive to light.   Cardiovascular:      Rate and Rhythm: Normal rate and regular rhythm.      Pulses: Normal pulses.      Heart sounds: Normal heart sounds. No murmur " heard.  Pulmonary:      Effort: Pulmonary effort is normal.      Breath sounds: Normal breath sounds.   Abdominal:      Palpations: Abdomen is soft. There is no mass.      Tenderness: There is no abdominal tenderness. There is no guarding or rebound.   Musculoskeletal:         General: No deformity. Normal range of motion.      Cervical back: Normal range of motion and neck supple.   Lymphadenopathy:      Cervical: No cervical adenopathy.   Skin:     General: Skin is warm and dry.   Neurological:      General: No focal deficit present.      Mental Status: She is alert.   Psychiatric:         Mood and Affect: Mood normal.         Behavior: Behavior normal.            No results found for any visits on 04/10/24.      Prior to immunization administration, verified patients identity using patient s name and date of birth. Please see Immunization Activity for additional information.     Screening Questionnaire for Adult Immunization    Are you sick today?   No   Do you have allergies to medications, food, a vaccine component or latex?   Yes   Have you ever had a serious reaction after receiving a vaccination?   No   Do you have a long-term health problem with heart, lung, kidney, or metabolic disease (e.g., diabetes), asthma, a blood disorder, no spleen, complement component deficiency, a cochlear implant, or a spinal fluid leak?  Are you on long-term aspirin therapy?   No   Do you have cancer, leukemia, HIV/AIDS, or any other immune system problem?   No   Do you have a parent, brother, or sister with an immune system problem?   No   In the past 3 months, have you taken medications that affect  your immune system, such as prednisone, other steroids, or anticancer drugs; drugs for the treatment of rheumatoid arthritis, Crohn s disease, or psoriasis; or have you had radiation treatments?   No   Have you had a seizure, or a brain or other nervous system problem?   No   During the past year, have you received a transfusion  of blood or blood    products, or been given immune (gamma) globulin or antiviral drug?   No   For women: Are you pregnant or is there a chance you could become       pregnant during the next month?   No   Have you received any vaccinations in the past 4 weeks?   No     Immunization questionnaire was positive for at least one answer.  Notified .      Patient instructed to remain in clinic for 15 minutes afterwards, and to report any adverse reactions.     Screening performed by Crystal Frank CMA on 4/10/2024 at 2:59 PM.        Signed Electronically by: RICKY ZUNIGA MD

## 2024-04-15 ENCOUNTER — HOSPITAL ENCOUNTER (OUTPATIENT)
Dept: CARDIOLOGY | Facility: HOSPITAL | Age: 69
Discharge: HOME | End: 2024-04-15
Attending: STUDENT IN AN ORGANIZED HEALTH CARE EDUCATION/TRAINING PROGRAM | Admitting: STUDENT IN AN ORGANIZED HEALTH CARE EDUCATION/TRAINING PROGRAM
Payer: COMMERCIAL

## 2024-04-15 DIAGNOSIS — I48.91 ATRIAL FIBRILLATION WITH RVR (H): ICD-10-CM

## 2024-04-15 LAB — LVEF ECHO: NORMAL

## 2024-04-15 PROCEDURE — 93306 TTE W/DOPPLER COMPLETE: CPT | Mod: 26 | Performed by: INTERNAL MEDICINE

## 2024-04-15 PROCEDURE — 93306 TTE W/DOPPLER COMPLETE: CPT

## 2024-04-21 ASSESSMENT — ENCOUNTER SYMPTOMS
HEMATURIA: 0
SHORTNESS OF BREATH: 0
CHILLS: 0
ABDOMINAL PAIN: 0
VOMITING: 0
SORE THROAT: 0
DYSURIA: 0
COUGH: 0
PALPITATIONS: 1
FEVER: 0
BACK PAIN: 0
EYE PAIN: 0
COLOR CHANGE: 0
ARTHRALGIAS: 0
SEIZURES: 0

## 2024-05-28 ENCOUNTER — NURSE TRIAGE (OUTPATIENT)
Dept: CARDIOLOGY | Facility: CLINIC | Age: 69
End: 2024-05-28
Payer: COMMERCIAL

## 2024-05-28 DIAGNOSIS — I48.91 ATRIAL FIBRILLATION WITH RVR (H): Primary | ICD-10-CM

## 2024-05-28 NOTE — TELEPHONE ENCOUNTER
"Received a call from the call center to discuss A fib.  Spoke to Summer who says she has been in A fib all day per her fitbit with her heart rates all over, and will jump up to 150's-170's briefly then drops to the 70's. She says she can feel her heart \"flip flops\" but says she feels fine, no dizziness, shortness of breath, or chest pain. She says she is taking Xarelto and metoprolol as prescribed. She took 12.5 mg of metoprolol early about 4 pm and will take the other 12.5 mg at 6 pm.  Current /85 HR 75 bpm.  Advised a message will be sent to [place] cardiology team for follow up next day.  Advised for persistent high heart rates or symptoms mentioned above, to call the on call cardiologist for recommendations. She verbalized agreement and understanding. Provided direct number.    1. DESCRIPTION: \"Please describe your heart rate or heartbeat that you are having\" (e.g., fast/slow, regular/irregular, skipped or extra beats, \"palpitations\") irregular  2. ONSET: \"When did it start?\" (Minutes, hours or days)going on all day  3. DURATION: \"How long does it last\" (e.g., seconds, minutes, hours)  4. PATTERN \"Does it come and go, or has it been constant since it started?\" \"Does it get worse with exertion?\" \"Are you feeling it now?\"  5. TAP: \"Using your hand, can you tap out what you are feeling on a chair or table in front of you, so that I can hear?\" (Note: not all patients can do this)  6. HEART RATE: \"Can you tell me your heart rate?\" \"How many beats in 15 seconds?\" (Note: not all patients can do this) currently 75 bpm  7. RECURRENT SYMPTOM: \"Have you ever had this before?\" If Yes, ask: \"When was the last time?\" and \"What happened that time?\"  8. CAUSE: \"What do you think is causing the palpitations?\"  9. CARDIAC HISTORY: \"Do you have any history of heart disease?\" (e.g., heart attack, angina, bypass surgery, angioplasty, arrhythmia) A fib  10. OTHER SYMPTOMS: \"Do you have any other symptoms?\" (e.g., dizziness, " "chest pain, sweating, difficulty breathing) denies  11. PREGNANCY: \"Is there any chance you are pregnant?\" \"When was your last menstrual period?\"  Additional Information   Negative: [1] Heart beating very rapidly (e.g., > 140 / minute) AND [2] present now  (Exception: During exercise.)   Negative: Difficulty breathing   Negative: Dizziness, lightheadedness, or weakness    Protocols used: Heart Rate and Heartbeat [id number]    "

## 2024-05-29 NOTE — TELEPHONE ENCOUNTER
PC with patient and review of response from provider. Agreeable to EP discussion. Follow up order placed. Discussion of when to present to ER after attempts at home to help AF. Pt currently getting over an upper respiratory illness and a recent death in the family. Stress and illness can bring on AF. Nofurther questions. Direct transfer to scheduling to arrange OV. Appt with ADW 6/26/24. PEACE RAMOS

## 2024-05-29 NOTE — TELEPHONE ENCOUNTER
Given the amount of afib she has been having I would see if she is interested in meeting with EP to discuss rhythm control strategies.

## 2024-06-26 ENCOUNTER — OFFICE VISIT (OUTPATIENT)
Dept: CARDIOLOGY | Facility: CLINIC | Age: 69
End: 2024-06-26
Attending: STUDENT IN AN ORGANIZED HEALTH CARE EDUCATION/TRAINING PROGRAM
Payer: COMMERCIAL

## 2024-06-26 VITALS
HEART RATE: 79 BPM | SYSTOLIC BLOOD PRESSURE: 120 MMHG | BODY MASS INDEX: 23.32 KG/M2 | RESPIRATION RATE: 20 BRPM | OXYGEN SATURATION: 97 % | DIASTOLIC BLOOD PRESSURE: 76 MMHG | WEIGHT: 148.9 LBS

## 2024-06-26 DIAGNOSIS — I48.91 ATRIAL FIBRILLATION WITH RVR (H): ICD-10-CM

## 2024-06-26 PROCEDURE — 99204 OFFICE O/P NEW MOD 45 MIN: CPT | Performed by: INTERNAL MEDICINE

## 2024-06-26 NOTE — LETTER
2024    RICKY ZUNIGA MD  [address] [address]    RE: Summer MAGDALENO Aroldo       Dear Colleague,     I had the pleasure of seeing Summer Kendrick in the [facility].     [facility]  Cardiac Electrophysiology  [address]  [place] [address]   Office: [phone]  Fax: [phone]     Patient: Summer Kendrick   : 1955       CHIEF COMPLAINT/REASON FOR VISIT  Paroxysmal atrial fibrillation and possible atrial flutter vs SVT,      Assessment/Recommendations     Stage 3A/paroxysmal atrial fibrillation and possible atrial flutter vs SVT - symptomatic with palpitations  [id number] 2, severe atrial dilation  We reviewed atrial fibrillation physiology, managing stroke risk, rate control, antiarrhythmic drug therapy, and catheter ablation.  We discussed atrial fibrillation ablation procedures, anticipated success rates, the potential need for re-do ablation vs addition of anti-arrhythmic drugs, procedural risks (including groin bleeding, vascular injury, tamponade, phrenic or esophageal injury, stroke, pulmonary vein stenosis) and recovery expectations.  She will take some time to consider options, and will contact us with decision regarding how to proceed  - if ablation elected upon, PVI, general anesthesia, continue rivaroxaban  - if AAD elected upon, sotalol 80mg twice daily in place of metoprolol, ECG for QTC 3 days after initiation  - Zio monitoring 14d (mail out) to assess subclinical atrial fibrillation burden  - consider an Apple Watch or Acronisa device for intermittent ECG assessments  - continue metoprolol 25mg twice daily  - continue rivaroxaban 20mg daily  - we discussed the ongoing importance of lifestyle modification (maintaining a healthy weight, aerobic activity, sleep apnea diagnosis and management, alcohol avoidance) as part of a long term strategy for atrial fibrillation management    Follow up: as above            History of Present Illness   Summer Kendrick is a 69 year old female with paroxysmal atrial fibrillation and possible atrial flutter vs SVT, secundum ASD with prior surgical repair 2014 referred by Dr. Mcclure for consultation regarding atrial fibrillation.    Mrs. Kendrick's atrial fibrillation history is as summarized below:  Symptoms: palpitations  Symptom onset date: 2014  Diagnosis date: 2014 - post-operative, clinical recurrence 3/2024  Admissions/ER visits: 3/31/2024 - AFL vs SVT, AF, converted to SR spontaneously  Prior medical therapies: amiodarone (1 month, 2014)  Prior DCCVs: none  Prior ablations: none  Percutaneous left atrial appendage occlusion: none    She enjoys being active with walking, hiking.  He denies chest pain, syncope.       Physical Examination  Review of Systems   VITALS: /76 (BP Location: Left arm, Patient Position: Sitting, Cuff Size: Adult Regular)   Pulse 79   Resp 20   Wt 67.5 kg (148 lb 14.4 oz)   LMP  (LMP Unknown)   SpO2 97%   BMI 23.32 kg/m    Wt Readings from Last 3 Encounters:   04/10/24 68.9 kg (152 lb)   04/01/24 69.5 kg (153 lb 4.8 oz)   03/31/24 69.6 kg (153 lb 6.4 oz)     CONSTITUTIONAL: well nourished, comfortable, no distress  EYES:  Conjunctivae pink, sclerae clear.    E/N/T:  Oral mucosa pink  RESPIRATORY:  Respiratory effort is normal  CARDIOVASCULAR:  normal S1 and S2  GASTROINTESTINAL:  Abdomen without masses or tenderness  EXTREMITIES:  No clubbing or cyanosis.    MUSCULOSKELETAL:  Overall grossly normal muscle strength  SKIN:  Overall, skin warm and dry, no lesions.  NEURO/PSYCH:  Oriented x 3 with normal affect.   Constitutional:  No weight loss or loss of appetite    Eyes:  No difficulty with vision, no double vision, no dry eyes  ENT:  No sore throat, difficulty swallowing; changes in hearing or tinnitus  Cardiovascular: As detailed above  Respiratory:  No cough  Musculoskeletal  No joint pain, muscle aches  Neurologic:  No syncope,  lightheadedness, fainting spells   Hematologic: No easy bruising, excessive bleeding tendency   Gastrointestinal:  No jaundice, abdominal pain or abdominal bloating  Genitourinary: No changes in urinary habits, no trouble urinating    Psychiatric: No anxiety or depression      Medical History  Surgical History   No past medical history on file. Past Surgical History:   Procedure Laterality Date    ZZC REPR ASD W BYPASS      Description: Repair Of Atrial Secundum Septal Defect;  Recorded: 06/03/2014;         Family History Social History   Family History   Problem Relation Age of Onset    Breast Cancer Mother 55.00        Social History     Tobacco Use    Smoking status: Never    Smokeless tobacco: Never   Substance Use Topics    Alcohol use: Yes     Alcohol/week: 1.0 standard drink of alcohol     Comment: Alcoholic Drinks/day: Ocasional    Drug use: No         Medications  Allergies     Current Outpatient Medications:     cetirizine (ZYRTEC ALLERGY) 10 MG tablet, Take 10 mg by mouth daily, Disp: , Rfl:     cholecalciferol (VITAMIN D3) 10 mcg (400 units) TABS tablet, , Disp: , Rfl:     metoprolol tartrate (LOPRESSOR) 25 MG tablet, Take 1 tablet (25 mg) by mouth 2 times daily, Disp: 180 tablet, Rfl: 2    rivaroxaban ANTICOAGULANT (XARELTO) 20 MG TABS tablet, Take 1 tablet (20 mg) by mouth daily (with dinner), Disp: 90 tablet, Rfl: 3     Allergies   Allergen Reactions    Nickel Unknown    Penicillins Rash          Lab Results    Chemistry CBC Cardiac Enzymes/BNP/TSH/INR   Recent Labs   Lab Test 03/31/24  0918      POTASSIUM 4.2   CHLORIDE 106   CO2 27   *   BUN 16.6   CR 1.04*   GFRESTIMATED 59*   JAIME 9.5     Recent Labs   Lab Test 03/31/24  0918 11/17/23  0852 10/02/23  1110   CR 1.04* 1.01* 0.96*          Recent Labs   Lab Test 03/31/24  0918   WBC 8.0   HGB 15.3   HCT 46.8   MCV 90        Recent Labs   Lab Test 03/31/24  0918 11/17/23  0852 11/16/22  1404   HGB 15.3 14.6 14.6    No results for  "input(s): \"TROPONINI\" in the last 68997 hours.  Recent Labs   Lab Test 03/31/24  0918   NTBNPI 1,036*     Recent Labs   Lab Test 11/17/23  0852   TSH 3.08     No results for input(s): \"INR\" in the last 23204 hours.      Data Review    ECGs (tracings independently reviewed)  3/31/2024 - SR 62bpm  3/31/2024 - AF, 122bpm, PVC  3/31/2024 - SVT vs AFL with 2:1 AV conduction, 138bpm    24hr Holter monitoring 7/8/2024 (independently reviewed)  SR, no AF    4/15/2024 TTE  Left ventricular size, wall motion and function are normal. The ejection  fraction is 60-65%.  The right ventricle is mild to moderately dilated.  The left atrium is moderate to severely dilated.  The right atrium is moderate to severely dilated.  There is no color Doppler evidence of an atrial shunt.  IVC diameter <2.1 cm collapsing >50% with sniff suggests a normal RA pressure  of 3 mmHg.  There is mild (1+) aortic regurgitation.       Cc: Elmo Mcclure MD, Lorie Ladd MD Amila Dilusha William, MD  6/26/2024  8:03 AM        Thank you for allowing me to participate in the care of your patient.      Sincerely,     Phil Johnson MD     [facility]  cc:   Elmo Mcclure, DO  [address]  [place] [address]      "

## 2024-06-26 NOTE — PROGRESS NOTES
[facility]  Cardiac Electrophysiology  [address]  [place] [address]   Office: [phone]  Fax: [phone]     Patient: Summer Kendrick   : 1955       CHIEF COMPLAINT/REASON FOR VISIT  Paroxysmal atrial fibrillation and possible atrial flutter vs SVT,      Assessment/Recommendations     Stage 3A/paroxysmal atrial fibrillation and possible atrial flutter vs SVT - symptomatic with palpitations  [id number] 2, severe atrial dilation  We reviewed atrial fibrillation physiology, managing stroke risk, rate control, antiarrhythmic drug therapy, and catheter ablation.  We discussed atrial fibrillation ablation procedures, anticipated success rates, the potential need for re-do ablation vs addition of anti-arrhythmic drugs, procedural risks (including groin bleeding, vascular injury, tamponade, phrenic or esophageal injury, stroke, pulmonary vein stenosis) and recovery expectations.  She will take some time to consider options, and will contact us with decision regarding how to proceed  - if ablation elected upon, PVI, general anesthesia, continue rivaroxaban  - if AAD elected upon, sotalol 80mg twice daily in place of metoprolol, ECG for QTC 3 days after initiation  - Zio monitoring 14d (mail out) to assess subclinical atrial fibrillation burden  - consider an Apple Watch or Konjekt device for intermittent ECG assessments  - continue metoprolol 25mg twice daily  - continue rivaroxaban 20mg daily  - we discussed the ongoing importance of lifestyle modification (maintaining a healthy weight, aerobic activity, sleep apnea diagnosis and management, alcohol avoidance) as part of a long term strategy for atrial fibrillation management    Follow up: as above           History of Present Illness   Summer Kendrick is a 69 year old female with paroxysmal atrial fibrillation and possible atrial flutter vs SVT, secundum ASD with prior surgical repair  referred by Dr. Mcclure for  consultation regarding atrial fibrillation.    Mrs. Kendrick's atrial fibrillation history is as summarized below:  Symptoms: palpitations  Symptom onset date: 2014  Diagnosis date: 2014 - post-operative, clinical recurrence 3/2024  Admissions/ER visits: 3/31/2024 - AFL vs SVT, AF, converted to SR spontaneously  Prior medical therapies: amiodarone (1 month, 2014)  Prior DCCVs: none  Prior ablations: none  Percutaneous left atrial appendage occlusion: none    She enjoys being active with walking, hiking.  He denies chest pain, syncope.       Physical Examination  Review of Systems   VITALS: /76 (BP Location: Left arm, Patient Position: Sitting, Cuff Size: Adult Regular)   Pulse 79   Resp 20   Wt 67.5 kg (148 lb 14.4 oz)   LMP  (LMP Unknown)   SpO2 97%   BMI 23.32 kg/m    Wt Readings from Last 3 Encounters:   04/10/24 68.9 kg (152 lb)   04/01/24 69.5 kg (153 lb 4.8 oz)   03/31/24 69.6 kg (153 lb 6.4 oz)     CONSTITUTIONAL: well nourished, comfortable, no distress  EYES:  Conjunctivae pink, sclerae clear.    E/N/T:  Oral mucosa pink  RESPIRATORY:  Respiratory effort is normal  CARDIOVASCULAR:  normal S1 and S2  GASTROINTESTINAL:  Abdomen without masses or tenderness  EXTREMITIES:  No clubbing or cyanosis.    MUSCULOSKELETAL:  Overall grossly normal muscle strength  SKIN:  Overall, skin warm and dry, no lesions.  NEURO/PSYCH:  Oriented x 3 with normal affect.   Constitutional:  No weight loss or loss of appetite    Eyes:  No difficulty with vision, no double vision, no dry eyes  ENT:  No sore throat, difficulty swallowing; changes in hearing or tinnitus  Cardiovascular: As detailed above  Respiratory:  No cough  Musculoskeletal  No joint pain, muscle aches  Neurologic:  No syncope, lightheadedness, fainting spells   Hematologic: No easy bruising, excessive bleeding tendency   Gastrointestinal:  No jaundice, abdominal pain or abdominal bloating  Genitourinary: No changes in urinary habits, no trouble urinating   "  Psychiatric: No anxiety or depression      Medical History  Surgical History   No past medical history on file. Past Surgical History:   Procedure Laterality Date    ZZC REPR ASD W BYPASS      Description: Repair Of Atrial Secundum Septal Defect;  Recorded: 06/03/2014;         Family History Social History   Family History   Problem Relation Age of Onset    Breast Cancer Mother 55.00        Social History     Tobacco Use    Smoking status: Never    Smokeless tobacco: Never   Substance Use Topics    Alcohol use: Yes     Alcohol/week: 1.0 standard drink of alcohol     Comment: Alcoholic Drinks/day: Ocasional    Drug use: No         Medications  Allergies     Current Outpatient Medications:     cetirizine (ZYRTEC ALLERGY) 10 MG tablet, Take 10 mg by mouth daily, Disp: , Rfl:     cholecalciferol (VITAMIN D3) 10 mcg (400 units) TABS tablet, , Disp: , Rfl:     metoprolol tartrate (LOPRESSOR) 25 MG tablet, Take 1 tablet (25 mg) by mouth 2 times daily, Disp: 180 tablet, Rfl: 2    rivaroxaban ANTICOAGULANT (XARELTO) 20 MG TABS tablet, Take 1 tablet (20 mg) by mouth daily (with dinner), Disp: 90 tablet, Rfl: 3     Allergies   Allergen Reactions    Nickel Unknown    Penicillins Rash          Lab Results    Chemistry CBC Cardiac Enzymes/BNP/TSH/INR   Recent Labs   Lab Test 03/31/24  0918      POTASSIUM 4.2   CHLORIDE 106   CO2 27   *   BUN 16.6   CR 1.04*   GFRESTIMATED 59*   JAIME 9.5     Recent Labs   Lab Test 03/31/24 0918 11/17/23  0852 10/02/23  1110   CR 1.04* 1.01* 0.96*          Recent Labs   Lab Test 03/31/24 0918   WBC 8.0   HGB 15.3   HCT 46.8   MCV 90        Recent Labs   Lab Test 03/31/24 0918 11/17/23  0852 11/16/22  1404   HGB 15.3 14.6 14.6    No results for input(s): \"TROPONINI\" in the last 85702 hours.  Recent Labs   Lab Test 03/31/24 0918   NTBNPI 1,036*     Recent Labs   Lab Test 11/17/23  0852   TSH 3.08     No results for input(s): \"INR\" in the last 60040 hours.      Data Review  "   ECGs (tracings independently reviewed)  3/31/2024 - SR 62bpm  3/31/2024 - AF, 122bpm, PVC  3/31/2024 - SVT vs AFL with 2:1 AV conduction, 138bpm    24hr Holter monitoring 7/8/2024 (independently reviewed)  SR, no AF    4/15/2024 TTE  Left ventricular size, wall motion and function are normal. The ejection  fraction is 60-65%.  The right ventricle is mild to moderately dilated.  The left atrium is moderate to severely dilated.  The right atrium is moderate to severely dilated.  There is no color Doppler evidence of an atrial shunt.  IVC diameter <2.1 cm collapsing >50% with sniff suggests a normal RA pressure  of 3 mmHg.  There is mild (1+) aortic regurgitation.       Cc: Elmo Mcclure MD, Lorie Ladd MD Amila Dilusha William, MD  6/26/2024  8:03 AM

## 2024-06-26 NOTE — PATIENT INSTRUCTIONS
[facility]  Cardiac Electrophysiology  [address]  [place] [address]   Office: [phone]  Fax: [phone]     Thank you for seeing us in clinic today - it is a pleasure to be a part of your care team.  Below is a summary of our plan from today's visit.       You have atrial fibrillation which appears to be progressing.  We reviewed atrial fibrillation, treatment options (ablation vs antiarrhythmic drug therapy eg. sotalol), and long term stroke risk prevention (blood thinner).    We will plan for the following:  - consider options further, and let us know with any questions or decision as to how you would like to proceed  - Zio monitoring 14d (mail out) to assess subclinical atrial fibrillation burden  - you may consider an Apple Watch, other ECG enabled device, or Potential device for intermittent ECG assessments  - continue metoprolol 25mg twice daily  - continue rivaroxaban 20mg daily     Please do not hesitate to be in touch with our office at [phone] with any questions that may arise.       Thank you for trusting us with your care,    Phil Johnson MD  Clinical Cardiac Electrophysiology  [facility]  [address]  [place] [address]   Office: [phone]  Fax: [phone]        ATRIAL FIBRILLATION: Patient Information    What is atrial fibrillation?  Atrial fibrillation (AF, A-fib) is a common heart rhythm problem (arrhythmia) occurring within the upper chambers of the heart (the atria).  In normal rhythm, the upper and lower chambers of the heart are electrically driven to contract in a coordinated sequence.  In atrial fibrillation, the atria lose their ability to contract because of rapid and chaotic electrical activity.  The lower chambers of the heart (the ventricles) continue to pump blood throughout the body, though with irregular and often faster rate due to the chaotic activity within the  atria.        How do I know if I have atrial fibrillation?   Some people may feel their heart beating faster, harder, or irregularly while in atrial fibrillation.  Others may be lightheaded, fatigued, feel weak or tired or become more short of breath especially with activities.  Some patients have no symptoms at all.  Atrial fibrillation may be found due to an irregular pulse or on an electrocardiogram (ECG). Atrial fibrillation can start and stop on its own, and episodes can last from seconds to several months.      How common is atrial fibrillation?   An estimated 3-6 million people in the [place] have atrial fibrillation.  Atrial fibrillation is a common heart rhythm problem for older persons, affecting as estimated 12-15% of people over the age of 65 years of age.    What causes atrial fibrillation?   Age is the most important risk factor for atrial fibrillation.  Atrial fibrillation is more common in people with other heart disease, high blood pressure, diabetes, obesity, sleep apnea and in people who regularly consume alcohol.  Surgery, lung disease, or thyroid problems can lead to atrial fibrillation.  Atrial fibrillation has multiple possible causes, and in most cases a single cause cannot be found.  Atrial fibrillation is a progressive condition, usually starting with at an early stage with short and infrequent episodes.  In later stages of disease, more frequent and longer lasting episodes of atrial fibrillation occur, ultimately culminating in episodes which do not spontaneously terminate.  Generally, more enlargement and scarring within the upper chambers of the heart is observed as atrial fibrillation progresses from early to late-stage disease.    How is atrial fibrillation diagnosed and evaluated?    Because of its start-stop nature, atrial fibrillation can be challenging to diagnose.  Atrial fibrillation is most commonly diagnosed via cardiac rhythm recordings - either an ECG or wearable  cardiac rhythm monitor.  For patients with pacemakers, defibrillators or implantable loop recorders, atrial fibrillation may be recorded via these devices.  Recently, commercially available devices (eg. Apple Watch, Dotspin device, certain FitBit devices, others) can allow patients to take 30 second cardiac rhythm recordings which may document atrial fibrillation.  Once atrial fibrillation is diagnosed, additional tests include blood tests and an echocardiogram.  The echocardiogram uses ultrasound to look at your heart to assess your cardiac function and evaluate for other heart disease.  Additional evaluation may include CT or MRI studies.    Is atrial fibrillation dangerous?   Atrial fibrillation is not usually a life-threatening arrhythmia.  The most serious consequences of atrial fibrillation including stroke and worsening of overall cardiac function.  While in atrial fibrillation, the upper cardiac chambers do not contract normally, resulting in slower blood flow and increased risk of clot formation.  If this blood clot becomes detached from the heart a stroke can occur.  Unfortunately, stroke can be the first sign of atrial fibrillation for some people.  With a stroke, you may notice abnormal sensation, weakness on one side of the body or face, changes in your vision or speech.  If you have any of these signs, you should contact EMS and be evaluated in an emergency room as soon as possible.      How is atrial fibrillation treated?     Several treatment options exist for suppressing atrial fibrillation - however, it is not an easily curable arrhythmia.  The first goal in managing atrial fibrillation is to minimize stroke risk.  The second goal is to improve symptoms associated with atrial fibrillation.  Finally, in patients with reduced cardiac function, maintaining normal rhythm can help improve cardiac function.      Blood thinners are used to reduce the risk of stroke in patients with high estimated stroke  risk related to atrial fibrillation.  For patients at higher risk of bleeding related to blood thinner use, implantable devices may be an option to reduce stroke risk without the need for long term blood thinner use.      Atrial fibrillation can be managed via two strategies: rate control and rhythm control.  In a rate control strategy, continued atrial fibrillation is accepted and medications (eg. beta-blockers or calcium channel blockers) are used to control the lower chamber rate.  In a rhythm control strategy, anti-arrhythmic medications or catheter ablation are used to maintain normal cardiac rhythm and slow disease progression by suppressing atrial fibrillation.  A procedure called a cardioversion, in which an electric shock is delivered through patches placed on the chest wall while under deep sedation, can be performed to temporarily restore normal cardiac rhythm, though does not address the chance of atrial fibrillation recurrence.  Treatments are more effective for earlier rather than later stage atrial fibrillation.  Lifestyle modifications (maintaining a healthy weight, aerobic exercise, diagnosing and treating sleep apnea, and minimizing alcohol intake) are important elements of atrial fibrillation rhythm control.     What is catheter ablation for atrial fibrillation?  Cardiac catheter ablation is a commonly performed, minimally invasive procedure performed by a cardiac electrophysiologist to treat many different cardiac rhythm abnormalities.  During catheter ablation, long, thin, flexible tubes are advanced into the heart via small sheaths inserted into the femoral veins and thermal energy (either heating or cooling) is applied within the heart to disrupt abnormal electrical activity.  Atrial fibrillation ablation is performed under general anesthesia, with procedures generally taking approximately 2-3 hours.  Patients are typically observed for 3-5 hours after the ablation, and in most cases can be  discharged home the same day.  Atrial fibrillation ablation is associated with better outcomes (mortality, cardiovascular hospitalizations, atrial arrhythmia recurrences) compared to antiarrhythmic drug therapy.  However, atrial fibrillation recurrences are not uncommon, and repeat catheter ablation may be offered.  Your electrophysiology team can review atrial fibrillation ablation, anticipated success rates, risks, and recovery expectations with you.    What are anti-arrhythmic medications?  Anti-arrhythmic medications are specialized drugs which alter cardiac electrical functioning to suppress arrhythmias.  There are several anti-arrhythmic medications available, each with its own success rate and side effects.  Some anti-arrhythmic medications are less effective though safer to use, others are more effective though have serious potential toxicities.  Atrial fibrillation recurrences are common and may require dose adjustment or change in antiarrhythmic therapy.  Your electrophysiology team will carefully consider which medication would be the best and safest for your particular case.      Can I live a normal life?    The goal of atrial fibrillation management is for patients to live normal lives without being limited by symptoms related to atrial fibrillation.    Are any additional educational resources available?  There are a number of excellent atrial fibrillation education resources available to you online.  A few options you may wish to review include:  hrsonline.org/guide-atrial-fibrillation  afibmatters.org  getsmartaboutafib.com  stopaf.com    What comes next?    Consider your management options and let us know how we can help in your decision process.  Please take medications as they have been prescribed.  You should also get any tests that may have been ordered for you.      When to Call Your Doctor or seek emergency care:  Call your doctor or seek emergency care if you have any significant changes with  the following:  Weakness  Dizziness  Fainting  Fatigue  Shortness of breath  Chest pain with increased activity  If you are concerned that your heart rate is too fast or too slow  Bleeding that does not stop in 10 minutes  Coughing or throwing up blood  Bloody diarrhea or bleeding hemorrhoids  Dark-colored urine or black stool  Allergic reactions:  Rash  Itching  Swelling  Trouble breathing or swallowing      Please call the [facility] at [phone] if you have concerns about your symptoms, your medicines, or your follow-up appointments.

## 2024-06-27 ENCOUNTER — ORDERS ONLY (AUTO-RELEASED) (OUTPATIENT)
Dept: CARDIOLOGY | Facility: CLINIC | Age: 69
End: 2024-06-27
Payer: COMMERCIAL

## 2024-06-27 DIAGNOSIS — I48.91 ATRIAL FIBRILLATION WITH RVR (H): ICD-10-CM

## 2024-07-16 PROCEDURE — 93248 EXT ECG>7D<15D REV&INTERPJ: CPT | Performed by: INTERNAL MEDICINE

## 2024-08-12 ENCOUNTER — DOCUMENTATION ONLY (OUTPATIENT)
Dept: CARDIOLOGY | Facility: CLINIC | Age: 69
End: 2024-08-12
Payer: COMMERCIAL

## 2024-08-19 ENCOUNTER — PREP FOR PROCEDURE (OUTPATIENT)
Dept: CARDIOLOGY | Facility: CLINIC | Age: 69
End: 2024-08-19
Payer: COMMERCIAL

## 2024-08-19 ENCOUNTER — DOCUMENTATION ONLY (OUTPATIENT)
Dept: CARDIOLOGY | Facility: CLINIC | Age: 69
End: 2024-08-19
Payer: COMMERCIAL

## 2024-08-19 DIAGNOSIS — I48.91 ATRIAL FIBRILLATION WITH RVR (H): Primary | ICD-10-CM

## 2024-08-19 RX ORDER — SODIUM CHLORIDE 9 MG/ML
100 INJECTION, SOLUTION INTRAVENOUS CONTINUOUS
Status: CANCELLED | OUTPATIENT
Start: 2024-09-23

## 2024-08-19 RX ORDER — LIDOCAINE 40 MG/G
CREAM TOPICAL
Status: CANCELLED | OUTPATIENT
Start: 2024-08-19

## 2024-08-19 NOTE — PROGRESS NOTES
H&P:  Card OV  Date:  EP ASHKAN [] PVI  Order Case Req Y  Order Set Y Lab/EKG   Orders [] Imaging Order None     AC Xarelto-CONTINUE   AAD Metoprolol-Continue   PPI/H2 Blocker Start Protonix 40mg Daily 3 days prior, 6wk post   Diuretics None   DM/GLP-1 DM Meds- None  GLP-1- None     Phil Johnson MD  P [facility] Ep Support [place] [facility]  Phone Number: [phone]     Hi Jessica,    Thanks for the note, and sounds good - can proceed with PVI, general anesthesia, continue rivaroxaban.  We should be okay with noted prior surgical secundum ASD repair.    Randymayelaharper Kendrick, 1955, [id number]  Home:[phone] (home) Cell:[phone] (mobile)  Emergency Contact: Nikhil Kendrick [phone]  PCP: Lorie Ladd, [phone]    Important patient information for CSC/Cath Lab staff : None    [facility] EP Cath Lab Procedure Order   Ablation Type:  Atrial Fibrillation with RVR  Ordering Provider: Dr Alex Delgado Ordered and Prepped: 8/19/2024 Georgia Abrams RN    Scheduling Information:  Anticipated Case Duration:  Standard ( Case per day SA 2:1, DW 5:1, KA 3:1)   Scheduling Timeframe:  Next Available  Scheduling Restrictions: None  Scheduling Contact: Please contact pt to schedule, if you are unable to schedule date within the next 24 hours please contact pt to update on scheduling process  EP RN Follow Up Apt: Schedule EP RN PC visit 3-4 days s/p PVI  MD Preference: Scheduling with ordering provider  Current Device/Device Co Needed for Procedure: None NoneNone  Pre-Procedural Testing needed: None  Mapping System Required:  Carto (Dr Johnson and Dr Shane)  ICE Needed:  Yes  Anesthesia:General Anesthesia    [facility] EP Cath Lab Prep   H&P:  Schedule H&P with EP ASHKAN, RN Teach, and Labs within 30 days of PVI  Pre-op Labs: CBC, BMP, Beta HcG if appropriate, and INR if on Warfarin will be ordered AM of procedure, if not completed at pre-op H&P within 7 days of procedure.  T&S Pre-Procedure Review: Does not  need for PVI procedures  Medical Records Pertinent for Procedure:   Zio 6/27/24, Echo 4/15/24  Iodinated Contrast Dye Allergies (Does not include Shellfish, Egg, and/or Iodine Allergy): NA  GLP-1 Protocol: If on Dulaglutide (Trulicity) (weekly)- Injection hold 7 days prior to procedure  , Exenatide extended release (Bydureon bcise) (weekly)- Injection hold 7 days prior to procedure, Exenatide (Byetta) (twice daily)- Oral Tablet hold day prior and morning of procedure and for Injection hold 7 days prior to procedure, Semaglutide (Ozempic) (weekly)- Injection and Oral hold 7 days prior to procedure, Liraglutide (Victoza, Saxenda) (daily)- Injection hold day prior and morning of procedure  Follow Up S/P: EP RN 3-4 PC Visit and EP ASHKAN 6wk (To be scheduled at time of case scheduling)    Allergies   Allergen Reactions    Nickel Unknown    Penicillins Rash       Current Outpatient Medications:     cetirizine (ZYRTEC ALLERGY) 10 MG tablet, Take 10 mg by mouth daily, Disp: , Rfl:     cholecalciferol (VITAMIN D3) 10 mcg (400 units) TABS tablet, , Disp: , Rfl:     metoprolol tartrate (LOPRESSOR) 25 MG tablet, Take 1 tablet (25 mg) by mouth 2 times daily, Disp: 180 tablet, Rfl: 2    rivaroxaban ANTICOAGULANT (XARELTO) 20 MG TABS tablet, Take 1 tablet (20 mg) by mouth daily (with dinner), Disp: 90 tablet, Rfl: 3    Documentation Date:8/19/2024 8:12 AM  Georgia Abrams RN

## 2024-09-13 NOTE — H&P (VIEW-ONLY)
[facility]  Cardiac Electrophysiology  [address]  [place] [address]   Office: [phone]  Fax: [phone]     HEART CARE ELECTROPHYSIOLOGY NOTE     Primary Care: Lorie Ladd MD       Assessment/Recommendations     Paroxysmal atrial fibrillation/stage 3A: With possible atrial flutter versus SVT.  We discussed pulmonary vein isolation ablation procedure including <1-2% risk for major complication, anticipated success rates, recovery and follow-up.  Medical and surgical history reviewed and updated. Current medications and allergies reviewed and updated as appropriate. No personal or family history of adverse reactions to anesthesia or abnormal bleeding with surgery.    [id number] score of 2 for age 65-74, gender    Status post ASD repair 2014    Plan:  Continue Xarelto 20 mg daily with dinner for stroke prophylaxis  Continue metoprolol 25 mg twice a day  Start Protonix 40mg every day beginning 3 days prior to ablation and continuing for 6 weeks thereafter  EP follow-up 6 weeks post ablation     History of Present Illness/Subjective    Summer Kendrick is a 69 year old female with past medical history significant for paroxysmal atrial fibrillation, possible atrial flutter versus SVT, secundum ASD status post repair 2014, seen today for history and physical prior to AF ablation.  This was first documented following ASD repair, recurring earlier this year associated with emotional stress, URI symptoms and dehydration.  She developed tachypalpitations for several hours prompting ED evaluation, converting with IV diltiazem, with recurrent symptoms 2 months later again lasting several hours.  Palpitations are not associated with dyspnea, dizziness, or chest discomfort and have been relatively quiescent more recently. She is active with walking and Silver Sneakers multiple times a week. She denies changes in activity tolerance, fatigue, pedal edema,  orthopnea or syncope.       Data Review     Arrhythmia hx:  Sx: Palpitations  Sx onset: 2014  Dx/date: Postoperative AF 2014, clinical recurrence 3/2024  Rate control: Metoprolol  AAD: Amiodarone (2014)  DCCV: none  Ablation: 9/23/2024 (Dr. Johnson)  [id number] 2 for age 65-74, gender  OAC: Rivaroxaban    EKG 9/16/2024 SR 71 bpm, QRS 70 ms, QT/QTc 400/434 ms  Personally reviewed.     TTE 4/15/2024  Left ventricular size, wall motion and function are normal. The ejection  fraction is 60-65%.  The right ventricle is mild to moderately dilated.  The left atrium is moderate to severely dilated.  The right atrium is moderate to severely dilated.  There is no color Doppler evidence of an atrial shunt.  IVC diameter <2.1 cm collapsing >50% with sniff suggests a normal RA pressure  of 3 mmHg.  There is mild (1+) aortic regurgitation.    Zio 7/2024  Indication for study: Atrial fibrillation  Time monitored: 13 days 21 hours.    Predominant rhythm: Normal sinus rhythm.  Conduction intervals are normal.  Patient recordings: Diary: one (irregular beats), and recordings demonstrated normal sinus rhythm with heart rates in the upper 60s and a single atrial couplet.  Triggered (no symptoms): six, and recordings demonstrated normal sinus rhythm with heart rates ranging between 57 and 86 bpm.  There was no ectopy or other pathologic rhythm disturbance.  Auto triggered recordings: recordings demonstrated sinus rhythm with occasional atrial and ventricular ectopy.  Impression:  Essentially normal multiday cardiac patch monitor.  A single symptomatic recording did not correlate with any significant arrhythmia.  No sustained atrial or ventricular tachyarrhythmia.  No profound bradycardia or significant/symptomatic pauses.    I have reviewed and updated the patient's past medical history, allergies and medication list.               Physical Examination Review of Systems   BMI= Body mass index is 23.68 kg/m .    Wt Readings from Last 3  "Encounters:   09/16/24 68.6 kg (151 lb 3.2 oz)   06/26/24 67.5 kg (148 lb 14.4 oz)   04/10/24 68.9 kg (152 lb)       Vitals: /70 (BP Location: Right arm, Patient Position: Sitting, Cuff Size: Adult Regular)   Pulse 71   Resp 16   Ht 1.702 m (5' 7\")   Wt 68.6 kg (151 lb 3.2 oz)   LMP  (LMP Unknown)   BMI 23.68 kg/m    General   Appearance:   Alert and oriented, in no acute distress.    HEENT:  Normocephalic and atraumatic. Conjunctiva and sclera are clear. Moist oral mucosa.    Neck: No JVP, carotid bruit or obvious thyromegaly.   Lungs:   Respirations unlabored. Clear bilaterally with no rales, rhonchi, or wheezes.     Cardiovascular:   Rhythm is regular. S1 and S2 are normal. No significant murmur is present. Lower extremities demonstrate no significant edema. Posterior tibial pulses are intact bilaterally.   Extremities: No cyanosis or clubbing   Skin: Skin is warm, dry, and otherwise intact.   Neurologic: Gait not asssessed. Mood and affect appropriate.                                                Medical History  Surgical History Family History Social History   No past medical history on file. Past Surgical History:   Procedure Laterality Date    ZZC REPR ASD W BYPASS      Description: Repair Of Atrial Secundum Septal Defect;  Recorded: 06/03/2014;    Family History   Problem Relation Age of Onset    Breast Cancer Mother 55.00    Social History     Tobacco Use    Smoking status: Never    Smokeless tobacco: Never   Substance Use Topics    Alcohol use: Not Currently     Alcohol/week: 1.0 standard drink of alcohol     Types: 1 Standard drinks or equivalent per week     Comment: Alcoholic Drinks/day: Ocasional    Drug use: No          Medications  Allergies   Current Outpatient Medications   Medication Sig Dispense Refill    cetirizine (ZYRTEC ALLERGY) 10 MG tablet Take 10 mg by mouth daily      cholecalciferol (VITAMIN D3) 10 mcg (400 units) TABS tablet       metoprolol tartrate (LOPRESSOR) 25 MG " "tablet Take 1 tablet (25 mg) by mouth 2 times daily 180 tablet 2    multivitamin w/minerals (MULTI-VITAMIN) tablet Take 1 tablet by mouth daily. 1/2 TABLET      rivaroxaban ANTICOAGULANT (XARELTO) 20 MG TABS tablet Take 1 tablet (20 mg) by mouth daily (with dinner) 90 tablet 3    Allergies   Allergen Reactions    Nickel Unknown    Penicillins Rash         Lab Results    Chemistry/lipid CBC Cardiac Enzymes/BNP/TSH/INR   Lab Results   Component Value Date    BUN 16.6 2024     2024    CO2 27 2024     No results found for: \"CREATININE\"    Lab Results   Component Value Date    CHOL 204 (H) 2023    HDL 69 2023     (H) 2023      Lab Results   Component Value Date    WBC 8.0 2024    HGB 15.3 2024    HCT 46.8 2024    MCV 90 2024     2024    Lab Results   Component Value Date    TSH 3.08 2023        25 minutes spent reviewing prior records (including documentation, laboratory studies, cardiac testing/imaging), history and physical exam, planning, and subsequent documentation.     The longitudinal plan of care for the diagnosis(es)/condition(s) as documented were addressed during this visit. Due to the added complexity in care, I will continue to support Summer in the subsequent management and with ongoing continuity of care.     This note has been dictated using voice recognition software. Any grammatical, typographical, or context distortions are unintentional and inherent to the software.    Ana Harris, CNP  Clinical Cardiac Electrophysiology  [facility]  Clinic and [phone]  Fax: [phone]  Electrophysiology Nurses: [phone]                                 "

## 2024-09-13 NOTE — PROGRESS NOTES
[facility]  Cardiac Electrophysiology  [address]  [place] [address]   Office: [phone]  Fax: [phone]     HEART CARE ELECTROPHYSIOLOGY NOTE     Primary Care: Lorie Ladd MD       Assessment/Recommendations     Paroxysmal atrial fibrillation/stage 3A: With possible atrial flutter versus SVT.  We discussed pulmonary vein isolation ablation procedure including <1-2% risk for major complication, anticipated success rates, recovery and follow-up.  Medical and surgical history reviewed and updated. Current medications and allergies reviewed and updated as appropriate. No personal or family history of adverse reactions to anesthesia or abnormal bleeding with surgery.    [id number] score of 2 for age 65-74, gender    Status post ASD repair 2014    Plan:  Continue Xarelto 20 mg daily with dinner for stroke prophylaxis  Continue metoprolol 25 mg twice a day  Start Protonix 40mg every day beginning 3 days prior to ablation and continuing for 6 weeks thereafter  EP follow-up 6 weeks post ablation     History of Present Illness/Subjective    Summer Kendrick is a 69 year old female with past medical history significant for paroxysmal atrial fibrillation, possible atrial flutter versus SVT, secundum ASD status post repair 2014, seen today for history and physical prior to AF ablation.  This was first documented following ASD repair, recurring earlier this year associated with emotional stress, URI symptoms and dehydration.  She developed tachypalpitations for several hours prompting ED evaluation, converting with IV diltiazem, with recurrent symptoms 2 months later again lasting several hours.  Palpitations are not associated with dyspnea, dizziness, or chest discomfort and have been relatively quiescent more recently. She is active with walking and Silver Sneakers multiple times a week. She denies changes in activity tolerance, fatigue, pedal edema,  orthopnea or syncope.       Data Review     Arrhythmia hx:  Sx: Palpitations  Sx onset: 2014  Dx/date: Postoperative AF 2014, clinical recurrence 3/2024  Rate control: Metoprolol  AAD: Amiodarone (2014)  DCCV: none  Ablation: 9/23/2024 (Dr. Johnson)  [id number] 2 for age 65-74, gender  OAC: Rivaroxaban    EKG 9/16/2024 SR 71 bpm, QRS 70 ms, QT/QTc 400/434 ms  Personally reviewed.     TTE 4/15/2024  Left ventricular size, wall motion and function are normal. The ejection  fraction is 60-65%.  The right ventricle is mild to moderately dilated.  The left atrium is moderate to severely dilated.  The right atrium is moderate to severely dilated.  There is no color Doppler evidence of an atrial shunt.  IVC diameter <2.1 cm collapsing >50% with sniff suggests a normal RA pressure  of 3 mmHg.  There is mild (1+) aortic regurgitation.    Zio 7/2024  Indication for study: Atrial fibrillation  Time monitored: 13 days 21 hours.    Predominant rhythm: Normal sinus rhythm.  Conduction intervals are normal.  Patient recordings: Diary: one (irregular beats), and recordings demonstrated normal sinus rhythm with heart rates in the upper 60s and a single atrial couplet.  Triggered (no symptoms): six, and recordings demonstrated normal sinus rhythm with heart rates ranging between 57 and 86 bpm.  There was no ectopy or other pathologic rhythm disturbance.  Auto triggered recordings: recordings demonstrated sinus rhythm with occasional atrial and ventricular ectopy.  Impression:  Essentially normal multiday cardiac patch monitor.  A single symptomatic recording did not correlate with any significant arrhythmia.  No sustained atrial or ventricular tachyarrhythmia.  No profound bradycardia or significant/symptomatic pauses.    I have reviewed and updated the patient's past medical history, allergies and medication list.               Physical Examination Review of Systems   BMI= Body mass index is 23.68 kg/m .    Wt Readings from Last 3  "Encounters:   09/16/24 68.6 kg (151 lb 3.2 oz)   06/26/24 67.5 kg (148 lb 14.4 oz)   04/10/24 68.9 kg (152 lb)       Vitals: /70 (BP Location: Right arm, Patient Position: Sitting, Cuff Size: Adult Regular)   Pulse 71   Resp 16   Ht 1.702 m (5' 7\")   Wt 68.6 kg (151 lb 3.2 oz)   LMP  (LMP Unknown)   BMI 23.68 kg/m    General   Appearance:   Alert and oriented, in no acute distress.    HEENT:  Normocephalic and atraumatic. Conjunctiva and sclera are clear. Moist oral mucosa.    Neck: No JVP, carotid bruit or obvious thyromegaly.   Lungs:   Respirations unlabored. Clear bilaterally with no rales, rhonchi, or wheezes.     Cardiovascular:   Rhythm is regular. S1 and S2 are normal. No significant murmur is present. Lower extremities demonstrate no significant edema. Posterior tibial pulses are intact bilaterally.   Extremities: No cyanosis or clubbing   Skin: Skin is warm, dry, and otherwise intact.   Neurologic: Gait not asssessed. Mood and affect appropriate.                                                Medical History  Surgical History Family History Social History   No past medical history on file. Past Surgical History:   Procedure Laterality Date    ZZC REPR ASD W BYPASS      Description: Repair Of Atrial Secundum Septal Defect;  Recorded: 06/03/2014;    Family History   Problem Relation Age of Onset    Breast Cancer Mother 55.00    Social History     Tobacco Use    Smoking status: Never    Smokeless tobacco: Never   Substance Use Topics    Alcohol use: Not Currently     Alcohol/week: 1.0 standard drink of alcohol     Types: 1 Standard drinks or equivalent per week     Comment: Alcoholic Drinks/day: Ocasional    Drug use: No          Medications  Allergies   Current Outpatient Medications   Medication Sig Dispense Refill    cetirizine (ZYRTEC ALLERGY) 10 MG tablet Take 10 mg by mouth daily      cholecalciferol (VITAMIN D3) 10 mcg (400 units) TABS tablet       metoprolol tartrate (LOPRESSOR) 25 MG " "tablet Take 1 tablet (25 mg) by mouth 2 times daily 180 tablet 2    multivitamin w/minerals (MULTI-VITAMIN) tablet Take 1 tablet by mouth daily. 1/2 TABLET      rivaroxaban ANTICOAGULANT (XARELTO) 20 MG TABS tablet Take 1 tablet (20 mg) by mouth daily (with dinner) 90 tablet 3    Allergies   Allergen Reactions    Nickel Unknown    Penicillins Rash         Lab Results    Chemistry/lipid CBC Cardiac Enzymes/BNP/TSH/INR   Lab Results   Component Value Date    BUN 16.6 2024     2024    CO2 27 2024     No results found for: \"CREATININE\"    Lab Results   Component Value Date    CHOL 204 (H) 2023    HDL 69 2023     (H) 2023      Lab Results   Component Value Date    WBC 8.0 2024    HGB 15.3 2024    HCT 46.8 2024    MCV 90 2024     2024    Lab Results   Component Value Date    TSH 3.08 2023        25 minutes spent reviewing prior records (including documentation, laboratory studies, cardiac testing/imaging), history and physical exam, planning, and subsequent documentation.     The longitudinal plan of care for the diagnosis(es)/condition(s) as documented were addressed during this visit. Due to the added complexity in care, I will continue to support Summer in the subsequent management and with ongoing continuity of care.     This note has been dictated using voice recognition software. Any grammatical, typographical, or context distortions are unintentional and inherent to the software.    Ana Harris, CNP  Clinical Cardiac Electrophysiology  [facility]  Clinic and [phone]  Fax: [phone]  Electrophysiology Nurses: [phone]                                 "

## 2024-09-16 ENCOUNTER — OFFICE VISIT (OUTPATIENT)
Dept: CARDIOLOGY | Facility: CLINIC | Age: 69
End: 2024-09-16
Payer: COMMERCIAL

## 2024-09-16 ENCOUNTER — LAB (OUTPATIENT)
Dept: CARDIOLOGY | Facility: CLINIC | Age: 69
End: 2024-09-16
Payer: COMMERCIAL

## 2024-09-16 ENCOUNTER — ALLIED HEALTH/NURSE VISIT (OUTPATIENT)
Dept: CARDIOLOGY | Facility: CLINIC | Age: 69
End: 2024-09-16
Payer: COMMERCIAL

## 2024-09-16 VITALS
BODY MASS INDEX: 23.73 KG/M2 | DIASTOLIC BLOOD PRESSURE: 70 MMHG | HEIGHT: 67 IN | RESPIRATION RATE: 16 BRPM | SYSTOLIC BLOOD PRESSURE: 138 MMHG | HEART RATE: 71 BPM | WEIGHT: 151.2 LBS

## 2024-09-16 DIAGNOSIS — I48.91 ATRIAL FIBRILLATION WITH RVR (H): Primary | ICD-10-CM

## 2024-09-16 DIAGNOSIS — I48.91 ATRIAL FIBRILLATION WITH RVR (H): ICD-10-CM

## 2024-09-16 DIAGNOSIS — I48.0 PAROXYSMAL ATRIAL FIBRILLATION (H): Primary | ICD-10-CM

## 2024-09-16 LAB
ANION GAP SERPL CALCULATED.3IONS-SCNC: 11 MMOL/L (ref 7–15)
ATRIAL RATE - MUSE: 71 BPM
BUN SERPL-MCNC: 19.3 MG/DL (ref 8–23)
CALCIUM SERPL-MCNC: 9.5 MG/DL (ref 8.8–10.4)
CHLORIDE SERPL-SCNC: 103 MMOL/L (ref 98–107)
CREAT SERPL-MCNC: 0.92 MG/DL (ref 0.51–0.95)
DIASTOLIC BLOOD PRESSURE - MUSE: NORMAL MMHG
EGFRCR SERPLBLD CKD-EPI 2021: 68 ML/MIN/1.73M2
ERYTHROCYTE [DISTWIDTH] IN BLOOD BY AUTOMATED COUNT: 12.6 % (ref 10–15)
GLUCOSE SERPL-MCNC: 98 MG/DL (ref 70–99)
HCO3 SERPL-SCNC: 23 MMOL/L (ref 22–29)
HCT VFR BLD AUTO: 43.7 % (ref 35–47)
HGB BLD-MCNC: 14.5 G/DL (ref 11.7–15.7)
INTERPRETATION ECG - MUSE: NORMAL
MCH RBC QN AUTO: 29.7 PG (ref 26.5–33)
MCHC RBC AUTO-ENTMCNC: 33.2 G/DL (ref 31.5–36.5)
MCV RBC AUTO: 89 FL (ref 78–100)
P AXIS - MUSE: 79 DEGREES
PLATELET # BLD AUTO: 281 10E3/UL (ref 150–450)
POTASSIUM SERPL-SCNC: 4.4 MMOL/L (ref 3.4–5.3)
PR INTERVAL - MUSE: 170 MS
QRS DURATION - MUSE: 78 MS
QT - MUSE: 400 MS
QTC - MUSE: 434 MS
R AXIS - MUSE: 75 DEGREES
RBC # BLD AUTO: 4.89 10E6/UL (ref 3.8–5.2)
SODIUM SERPL-SCNC: 137 MMOL/L (ref 135–145)
SYSTOLIC BLOOD PRESSURE - MUSE: NORMAL MMHG
T AXIS - MUSE: 67 DEGREES
VENTRICULAR RATE- MUSE: 71 BPM
WBC # BLD AUTO: 6.1 10E3/UL (ref 4–11)

## 2024-09-16 PROCEDURE — 80048 BASIC METABOLIC PNL TOTAL CA: CPT

## 2024-09-16 PROCEDURE — 36415 COLL VENOUS BLD VENIPUNCTURE: CPT

## 2024-09-16 PROCEDURE — 99214 OFFICE O/P EST MOD 30 MIN: CPT | Performed by: NURSE PRACTITIONER

## 2024-09-16 PROCEDURE — 93000 ELECTROCARDIOGRAM COMPLETE: CPT | Performed by: INTERNAL MEDICINE

## 2024-09-16 PROCEDURE — G2211 COMPLEX E/M VISIT ADD ON: HCPCS | Performed by: NURSE PRACTITIONER

## 2024-09-16 PROCEDURE — 85027 COMPLETE CBC AUTOMATED: CPT

## 2024-09-16 PROCEDURE — 99207 PR NO CHARGE NURSE ONLY: CPT

## 2024-09-16 RX ORDER — MULTIVITAMIN
1 TABLET ORAL DAILY
COMMUNITY

## 2024-09-16 RX ORDER — PANTOPRAZOLE SODIUM 40 MG/1
40 TABLET, DELAYED RELEASE ORAL DAILY
Qty: 45 TABLET | Refills: 0 | Status: SHIPPED | OUTPATIENT
Start: 2024-09-16

## 2024-09-16 NOTE — LETTER
9/16/2024    LORIE LADD MD  [address] [place] [address]    RE: Summer Kendrick       Dear Colleague,     I had the pleasure of seeing Summer Kendrick in the [facility].       [facility]  Cardiac Electrophysiology  [address]  [place] [address]   Office: [phone]  Fax: [phone]     HEART CARE ELECTROPHYSIOLOGY NOTE     Primary Care: Lorie Ladd MD       Assessment/Recommendations     Paroxysmal atrial fibrillation/stage 3A: With possible atrial flutter versus SVT.  We discussed pulmonary vein isolation ablation procedure including <1-2% risk for major complication, anticipated success rates, recovery and follow-up.  Medical and surgical history reviewed and updated. Current medications and allergies reviewed and updated as appropriate. No personal or family history of adverse reactions to anesthesia or abnormal bleeding with surgery.    [id number] score of 2 for age 65-74, gender    Status post ASD repair 2014    Plan:  Continue Xarelto 20 mg daily with dinner for stroke prophylaxis  Continue metoprolol 25 mg twice a day  Start Protonix 40mg every day beginning 3 days prior to ablation and continuing for 6 weeks thereafter  EP follow-up 6 weeks post ablation     History of Present Illness/Subjective    Summer Kendrick is a 69 year old female with past medical history significant for paroxysmal atrial fibrillation, possible atrial flutter versus SVT, secundum ASD status post repair 2014, seen today for history and physical prior to AF ablation.  This was first documented following ASD repair, recurring earlier this year associated with emotional stress, URI symptoms and dehydration.  She developed tachypalpitations for several hours prompting ED evaluation, converting with IV diltiazem, with recurrent symptoms 2 months later again lasting several hours.  Palpitations are not associated with dyspnea, dizziness,  or chest discomfort and have been relatively quiescent more recently. She is active with walking and Silver Sneakers multiple times a week. She denies changes in activity tolerance, fatigue, pedal edema, orthopnea or syncope.       Data Review     Arrhythmia hx:  Sx: Palpitations  Sx onset: 2014  Dx/date: Postoperative AF 2014, clinical recurrence 3/2024  Rate control: Metoprolol  AAD: Amiodarone (2014)  DCCV: none  Ablation: 9/23/2024 (Dr. Johnson)  [id number] 2 for age 65-74, gender  OAC: Rivaroxaban    EKG 9/16/2024 SR 71 bpm, QRS 70 ms, QT/QTc 400/434 ms  Personally reviewed.     TTE 4/15/2024  Left ventricular size, wall motion and function are normal. The ejection  fraction is 60-65%.  The right ventricle is mild to moderately dilated.  The left atrium is moderate to severely dilated.  The right atrium is moderate to severely dilated.  There is no color Doppler evidence of an atrial shunt.  IVC diameter <2.1 cm collapsing >50% with sniff suggests a normal RA pressure  of 3 mmHg.  There is mild (1+) aortic regurgitation.    o 7/2024  Indication for study: Atrial fibrillation  Time monitored: 13 days 21 hours.    Predominant rhythm: Normal sinus rhythm.  Conduction intervals are normal.  Patient recordings: Diary: one (irregular beats), and recordings demonstrated normal sinus rhythm with heart rates in the upper 60s and a single atrial couplet.  Triggered (no symptoms): six, and recordings demonstrated normal sinus rhythm with heart rates ranging between 57 and 86 bpm.  There was no ectopy or other pathologic rhythm disturbance.  Auto triggered recordings: recordings demonstrated sinus rhythm with occasional atrial and ventricular ectopy.  Impression:  Essentially normal multiday cardiac patch monitor.  A single symptomatic recording did not correlate with any significant arrhythmia.  No sustained atrial or ventricular tachyarrhythmia.  No profound bradycardia or significant/symptomatic pauses.    I have  "reviewed and updated the patient's past medical history, allergies and medication list.               Physical Examination Review of Systems   BMI= Body mass index is 23.68 kg/m .    Wt Readings from Last 3 Encounters:   09/16/24 68.6 kg (151 lb 3.2 oz)   06/26/24 67.5 kg (148 lb 14.4 oz)   04/10/24 68.9 kg (152 lb)       Vitals: /70 (BP Location: Right arm, Patient Position: Sitting, Cuff Size: Adult Regular)   Pulse 71   Resp 16   Ht 1.702 m (5' 7\")   Wt 68.6 kg (151 lb 3.2 oz)   LMP  (LMP Unknown)   BMI 23.68 kg/m    General   Appearance:   Alert and oriented, in no acute distress.    HEENT:  Normocephalic and atraumatic. Conjunctiva and sclera are clear. Moist oral mucosa.    Neck: No JVP, carotid bruit or obvious thyromegaly.   Lungs:   Respirations unlabored. Clear bilaterally with no rales, rhonchi, or wheezes.     Cardiovascular:   Rhythm is regular. S1 and S2 are normal. No significant murmur is present. Lower extremities demonstrate no significant edema. Posterior tibial pulses are intact bilaterally.   Extremities: No cyanosis or clubbing   Skin: Skin is warm, dry, and otherwise intact.   Neurologic: Gait not asssessed. Mood and affect appropriate.                                                Medical History  Surgical History Family History Social History   No past medical history on file. Past Surgical History:   Procedure Laterality Date     ZZC REPR ASD W BYPASS      Description: Repair Of Atrial Secundum Septal Defect;  Recorded: 06/03/2014;    Family History   Problem Relation Age of Onset     Breast Cancer Mother 55.00    Social History     Tobacco Use     Smoking status: Never     Smokeless tobacco: Never   Substance Use Topics     Alcohol use: Not Currently     Alcohol/week: 1.0 standard drink of alcohol     Types: 1 Standard drinks or equivalent per week     Comment: Alcoholic Drinks/day: Ocasional     Drug use: No          Medications  Allergies   Current Outpatient Medications " "  Medication Sig Dispense Refill     cetirizine (ZYRTEC ALLERGY) 10 MG tablet Take 10 mg by mouth daily       cholecalciferol (VITAMIN D3) 10 mcg (400 units) TABS tablet        metoprolol tartrate (LOPRESSOR) 25 MG tablet Take 1 tablet (25 mg) by mouth 2 times daily 180 tablet 2     multivitamin w/minerals (MULTI-VITAMIN) tablet Take 1 tablet by mouth daily. 1/2 TABLET       rivaroxaban ANTICOAGULANT (XARELTO) 20 MG TABS tablet Take 1 tablet (20 mg) by mouth daily (with dinner) 90 tablet 3    Allergies   Allergen Reactions     Nickel Unknown     Penicillins Rash         Lab Results    Chemistry/lipid CBC Cardiac Enzymes/BNP/TSH/INR   Lab Results   Component Value Date    BUN 16.6 2024     2024    CO2 27 2024     No results found for: \"CREATININE\"    Lab Results   Component Value Date    CHOL 204 (H) 2023    HDL 69 2023     (H) 2023      Lab Results   Component Value Date    WBC 8.0 2024    HGB 15.3 2024    HCT 46.8 2024    MCV 90 2024     2024    Lab Results   Component Value Date    TSH 3.08 2023        25 minutes spent reviewing prior records (including documentation, laboratory studies, cardiac testing/imaging), history and physical exam, planning, and subsequent documentation.     The longitudinal plan of care for the diagnosis(es)/condition(s) as documented were addressed during this visit. Due to the added complexity in care, I will continue to support Summer in the subsequent management and with ongoing continuity of care.     This note has been dictated using voice recognition software. Any grammatical, typographical, or context distortions are unintentional and inherent to the software.    Ana Harris, CNP  Clinical Cardiac Electrophysiology  [facility]  Clinic and [phone]  Fax: [phone]  Electrophysiology Nurses: [phone]                                     Thank you " for allowing me to participate in the care of your patient.      Sincerely,     DOMITILA CASTANEDA [facility]  cc:   Phil Johnson MD  [address]  [place] [address]

## 2024-09-16 NOTE — PROGRESS NOTES
Pre-Procedure Pulmonary Vein Ablation (AF) Education    Procedure: PVI with Dr Johnson on 9/23 with arrival time 8:30 am    COVID: Pt denies COVID like symptoms, and is aware if he/she develops COVID like symptoms they would need to complete an at home with a rapid antigen COVID test 1-2 days prior to your procedure date. If COVID + pt is aware the procedure will need to be rescheduled, and to contact CV scheduling as soon as possible    Type & Screen: Is not required for PVI Ablation    Pre-Op H&P: Completed today with EP ASHKAN- See record in Epic    Education:   Reviewed with pt in Clinic today  Pre-Procedure Instruction: NPO after midnight pre procedure, Defined NPO, Remove all jewelry and leave all valuables at home, Shower prior to arrival, Anesthesia and intubation plan/orders, Intra-procedure PVI process, Post- PVI procedure expectations/recovery, Transportation requirements and arrangements post procedure, Post-procedure follow up process, Letter sent to pt via [employer] and mail with written instructions (Refer to letters tab), Lab results would be called to pt if abnormal  Risks:   Atrial Fibrillation Ablation/Left Atrial Ablation  Cardiac Ablation  <1% Hypotension, Hemorrhage, Thrombophlebitis, Systemic or pulmonic emboli, Cardiac perforation (tamponade), Infection, Pneumothorax, Arrhythmias, Proarrhythmic effects of drugs, Radiation exposure, Catheter entrapment  <1 % Vascular injury including perforation of vein, artery or heart  1-2% Tamponade and Aortic puncture with left sided transeptal approach  1% CVA   <1% MI  <0.1% death  If external defibrillation or CV is needed, 25% risk for superficial burn  Risks associated with general anesthesia will be addressed by the Anesthesiology Department  Radiofrequency Risks:  In addition to standard risks for Radiofrequency Ablation, there is:  <2% Significant pulmonary vein stenosis  <2% Embolic events  <1% Esophageal fistula  <1% Phrenic nerve paralysis    Cryoablation Risks:  In addition to standard risks for Cryoablation Ablation, there is:  <1% Phrenic nerve paralysis  <1% Pulmonary vein stenosis  <1% Esophageal fistula    Medication:   Instructions regarding anticoagulants: Xarelto- Continue anticoagulation uninterrupted through their procedure, do not miss any doses of AC prior to procedure, importance of taking AC for stroke prevention, taking AC as prescribed, to call prior to PVI if missed a dose of AC, and if upon arrival pt reports missing a dose of AC PVI will potentially be cnx/postponed  Instructions given to pt regarding antiarrhythmic medication: None- N/A  Instructions given to pt regarding PPI medication: Start Protonix 40mg Daily 3 days prior, 6wk post  Instructions given to pt regarding diuretics medication: None  Instructions given to pt regarding DM/GLP-1 medication:   DM- None  GLP-1- None  Instructions for medication, other than anticoagulants and antiarrhythmics listed above, given to pt: Take all medication AM of procedure with small sips of water     Important patient information for staff: None    9/16/2024 10:39 AM  Elyssa Mason RN

## 2024-09-23 ENCOUNTER — ANESTHESIA EVENT (OUTPATIENT)
Dept: CARDIOLOGY | Facility: HOSPITAL | Age: 69
End: 2024-09-23
Payer: COMMERCIAL

## 2024-09-23 ENCOUNTER — ANESTHESIA (OUTPATIENT)
Dept: CARDIOLOGY | Facility: HOSPITAL | Age: 69
End: 2024-09-23
Payer: COMMERCIAL

## 2024-09-23 ENCOUNTER — HOSPITAL ENCOUNTER (OUTPATIENT)
Facility: HOSPITAL | Age: 69
Discharge: HOME | End: 2024-09-23
Attending: INTERNAL MEDICINE | Admitting: INTERNAL MEDICINE
Payer: COMMERCIAL

## 2024-09-23 VITALS
OXYGEN SATURATION: 99 % | BODY MASS INDEX: 23.7 KG/M2 | DIASTOLIC BLOOD PRESSURE: 66 MMHG | WEIGHT: 151 LBS | SYSTOLIC BLOOD PRESSURE: 123 MMHG | HEIGHT: 67 IN | HEART RATE: 69 BPM | RESPIRATION RATE: 16 BRPM | TEMPERATURE: 97.4 F

## 2024-09-23 DIAGNOSIS — I48.0 PAROXYSMAL ATRIAL FIBRILLATION (H): Primary | ICD-10-CM

## 2024-09-23 DIAGNOSIS — I48.91 ATRIAL FIBRILLATION WITH RVR (H): ICD-10-CM

## 2024-09-23 LAB
ACT BLD: 299 SECONDS (ref 74–150)
ACT BLD: 392 SECONDS (ref 74–150)
ATRIAL RATE - MUSE: 69 BPM
DIASTOLIC BLOOD PRESSURE - MUSE: NORMAL MMHG
INTERPRETATION ECG - MUSE: NORMAL
P AXIS - MUSE: 77 DEGREES
PR INTERVAL - MUSE: 166 MS
QRS DURATION - MUSE: 92 MS
QT - MUSE: 454 MS
QTC - MUSE: 486 MS
R AXIS - MUSE: 67 DEGREES
SYSTOLIC BLOOD PRESSURE - MUSE: NORMAL MMHG
T AXIS - MUSE: 70 DEGREES
VENTRICULAR RATE- MUSE: 69 BPM

## 2024-09-23 PROCEDURE — 85347 COAGULATION TIME ACTIVATED: CPT

## 2024-09-23 PROCEDURE — 250N000011 HC RX IP 250 OP 636: Performed by: NURSE ANESTHETIST, CERTIFIED REGISTERED

## 2024-09-23 PROCEDURE — 93615 ESOPHAGEAL RECORDING: CPT | Mod: 26 | Performed by: INTERNAL MEDICINE

## 2024-09-23 PROCEDURE — C1732 CATH, EP, DIAG/ABL, 3D/VECT: HCPCS | Performed by: INTERNAL MEDICINE

## 2024-09-23 PROCEDURE — C1887 CATHETER, GUIDING: HCPCS | Performed by: INTERNAL MEDICINE

## 2024-09-23 PROCEDURE — C1733 CATH, EP, OTHR THAN COOL-TIP: HCPCS | Performed by: INTERNAL MEDICINE

## 2024-09-23 PROCEDURE — 93623 PRGRMD STIMJ&PACG IV RX NFS: CPT | Performed by: INTERNAL MEDICINE

## 2024-09-23 PROCEDURE — 93615 ESOPHAGEAL RECORDING: CPT | Performed by: INTERNAL MEDICINE

## 2024-09-23 PROCEDURE — 258N000003 HC RX IP 258 OP 636: Performed by: NURSE ANESTHETIST, CERTIFIED REGISTERED

## 2024-09-23 PROCEDURE — C1759 CATH, INTRA ECHOCARDIOGRAPHY: HCPCS | Performed by: INTERNAL MEDICINE

## 2024-09-23 PROCEDURE — 250N000011 HC RX IP 250 OP 636: Performed by: INTERNAL MEDICINE

## 2024-09-23 PROCEDURE — C1730 CATH, EP, 19 OR FEW ELECT: HCPCS | Performed by: INTERNAL MEDICINE

## 2024-09-23 PROCEDURE — 93656 COMPRE EP EVAL ABLTJ ATR FIB: CPT | Performed by: INTERNAL MEDICINE

## 2024-09-23 PROCEDURE — 710N000010 HC RECOVERY PHASE 1, LEVEL 2, PER MIN

## 2024-09-23 PROCEDURE — 272N000001 HC OR GENERAL SUPPLY STERILE: Performed by: INTERNAL MEDICINE

## 2024-09-23 PROCEDURE — 999N000054 HC STATISTIC EKG NON-CHARGEABLE

## 2024-09-23 PROCEDURE — 93656 COMPRE EP EVAL ABLTJ ATR FIB: CPT | Performed by: ANESTHESIOLOGY

## 2024-09-23 PROCEDURE — 93656 COMPRE EP EVAL ABLTJ ATR FIB: CPT | Performed by: NURSE ANESTHETIST, CERTIFIED REGISTERED

## 2024-09-23 PROCEDURE — 93623 PRGRMD STIMJ&PACG IV RX NFS: CPT | Mod: 26 | Performed by: INTERNAL MEDICINE

## 2024-09-23 PROCEDURE — C1769 GUIDE WIRE: HCPCS | Performed by: INTERNAL MEDICINE

## 2024-09-23 PROCEDURE — C1766 INTRO/SHEATH,STRBLE,NON-PEEL: HCPCS | Performed by: INTERNAL MEDICINE

## 2024-09-23 PROCEDURE — 250N000009 HC RX 250: Performed by: INTERNAL MEDICINE

## 2024-09-23 PROCEDURE — 93005 ELECTROCARDIOGRAM TRACING: CPT

## 2024-09-23 PROCEDURE — 93662 INTRACARDIAC ECG (ICE): CPT | Performed by: INTERNAL MEDICINE

## 2024-09-23 PROCEDURE — 250N000009 HC RX 250: Performed by: NURSE ANESTHETIST, CERTIFIED REGISTERED

## 2024-09-23 PROCEDURE — 93010 ELECTROCARDIOGRAM REPORT: CPT | Performed by: INTERNAL MEDICINE

## 2024-09-23 PROCEDURE — 370N000017 HC ANESTHESIA TECHNICAL FEE, PER MIN: Performed by: INTERNAL MEDICINE

## 2024-09-23 RX ORDER — ONDANSETRON 2 MG/ML
4 INJECTION INTRAMUSCULAR; INTRAVENOUS EVERY 6 HOURS PRN
Status: DISCONTINUED | OUTPATIENT
Start: 2024-09-23 | End: 2024-09-23 | Stop reason: HOSPADM

## 2024-09-23 RX ORDER — DEXAMETHASONE SODIUM PHOSPHATE 10 MG/ML
INJECTION, SOLUTION INTRAMUSCULAR; INTRAVENOUS PRN
Status: DISCONTINUED | OUTPATIENT
Start: 2024-09-23 | End: 2024-09-23

## 2024-09-23 RX ORDER — ONDANSETRON 2 MG/ML
INJECTION INTRAMUSCULAR; INTRAVENOUS PRN
Status: DISCONTINUED | OUTPATIENT
Start: 2024-09-23 | End: 2024-09-23

## 2024-09-23 RX ORDER — SODIUM CHLORIDE 9 MG/ML
100 INJECTION, SOLUTION INTRAVENOUS CONTINUOUS
Status: DISCONTINUED | OUTPATIENT
Start: 2024-09-23 | End: 2024-09-23 | Stop reason: HOSPADM

## 2024-09-23 RX ORDER — SODIUM CHLORIDE 9 MG/ML
INJECTION, SOLUTION INTRAVENOUS CONTINUOUS PRN
Status: DISCONTINUED | OUTPATIENT
Start: 2024-09-23 | End: 2024-09-23

## 2024-09-23 RX ORDER — LIDOCAINE HYDROCHLORIDE AND EPINEPHRINE 10; 10 MG/ML; UG/ML
INJECTION, SOLUTION INFILTRATION; PERINEURAL
Status: DISCONTINUED | OUTPATIENT
Start: 2024-09-23 | End: 2024-09-23 | Stop reason: HOSPADM

## 2024-09-23 RX ORDER — BUPIVACAINE HYDROCHLORIDE 2.5 MG/ML
INJECTION, SOLUTION EPIDURAL; INFILTRATION; INTRACAUDAL
Status: DISCONTINUED | OUTPATIENT
Start: 2024-09-23 | End: 2024-09-23 | Stop reason: HOSPADM

## 2024-09-23 RX ORDER — ADENOSINE 3 MG/ML
INJECTION, SOLUTION INTRAVENOUS
Status: DISCONTINUED | OUTPATIENT
Start: 2024-09-23 | End: 2024-09-23 | Stop reason: HOSPADM

## 2024-09-23 RX ORDER — IBUPROFEN 600 MG/1
600 TABLET, FILM COATED ORAL EVERY 6 HOURS PRN
Status: DISCONTINUED | OUTPATIENT
Start: 2024-09-23 | End: 2024-09-23 | Stop reason: HOSPADM

## 2024-09-23 RX ORDER — ACETAMINOPHEN 325 MG/1
650 TABLET ORAL EVERY 4 HOURS PRN
Status: DISCONTINUED | OUTPATIENT
Start: 2024-09-23 | End: 2024-09-23 | Stop reason: HOSPADM

## 2024-09-23 RX ORDER — HEPARIN SODIUM 10000 [USP'U]/100ML
INJECTION, SOLUTION INTRAVENOUS CONTINUOUS PRN
Status: COMPLETED | OUTPATIENT
Start: 2024-09-23 | End: 2024-09-23

## 2024-09-23 RX ORDER — FENTANYL CITRATE 50 UG/ML
INJECTION, SOLUTION INTRAMUSCULAR; INTRAVENOUS PRN
Status: DISCONTINUED | OUTPATIENT
Start: 2024-09-23 | End: 2024-09-23

## 2024-09-23 RX ORDER — GLYCOPYRROLATE 0.2 MG/ML
INJECTION, SOLUTION INTRAMUSCULAR; INTRAVENOUS PRN
Status: DISCONTINUED | OUTPATIENT
Start: 2024-09-23 | End: 2024-09-23

## 2024-09-23 RX ORDER — PROPOFOL 10 MG/ML
INJECTION, EMULSION INTRAVENOUS PRN
Status: DISCONTINUED | OUTPATIENT
Start: 2024-09-23 | End: 2024-09-23

## 2024-09-23 RX ORDER — HEPARIN SODIUM 1000 [USP'U]/ML
INJECTION, SOLUTION INTRAVENOUS; SUBCUTANEOUS
Status: DISCONTINUED | OUTPATIENT
Start: 2024-09-23 | End: 2024-09-23 | Stop reason: HOSPADM

## 2024-09-23 RX ORDER — PROTAMINE SULFATE 10 MG/ML
INJECTION, SOLUTION INTRAVENOUS PRN
Status: DISCONTINUED | OUTPATIENT
Start: 2024-09-23 | End: 2024-09-23

## 2024-09-23 RX ORDER — ONDANSETRON 4 MG/1
4 TABLET, ORALLY DISINTEGRATING ORAL EVERY 6 HOURS PRN
Status: DISCONTINUED | OUTPATIENT
Start: 2024-09-23 | End: 2024-09-23 | Stop reason: HOSPADM

## 2024-09-23 RX ORDER — LIDOCAINE 40 MG/G
CREAM TOPICAL
Status: DISCONTINUED | OUTPATIENT
Start: 2024-09-23 | End: 2024-09-23 | Stop reason: HOSPADM

## 2024-09-23 RX ADMIN — SODIUM CHLORIDE: 9 INJECTION, SOLUTION INTRAVENOUS at 10:16

## 2024-09-23 RX ADMIN — ONDANSETRON 4 MG: 2 INJECTION INTRAMUSCULAR; INTRAVENOUS at 10:08

## 2024-09-23 RX ADMIN — Medication 100 MG: at 09:59

## 2024-09-23 RX ADMIN — GLYCOPYRROLATE 0.2 MG: 0.2 INJECTION INTRAMUSCULAR; INTRAVENOUS at 10:09

## 2024-09-23 RX ADMIN — MIDAZOLAM 2 MG: 1 INJECTION INTRAMUSCULAR; INTRAVENOUS at 09:53

## 2024-09-23 RX ADMIN — SUGAMMADEX 200 MG: 100 INJECTION, SOLUTION INTRAVENOUS at 11:03

## 2024-09-23 RX ADMIN — PROTAMINE SULFATE 50 MG: 10 INJECTION, SOLUTION INTRAVENOUS at 11:02

## 2024-09-23 RX ADMIN — FENTANYL CITRATE 100 MCG: 50 INJECTION INTRAMUSCULAR; INTRAVENOUS at 09:59

## 2024-09-23 RX ADMIN — DEXAMETHASONE SODIUM PHOSPHATE 10 MG: 10 INJECTION, SOLUTION INTRAMUSCULAR; INTRAVENOUS at 10:08

## 2024-09-23 RX ADMIN — PHENYLEPHRINE HYDROCHLORIDE 0.4 MCG/KG/MIN: 10 INJECTION INTRAVENOUS at 10:08

## 2024-09-23 RX ADMIN — SODIUM CHLORIDE: 9 INJECTION, SOLUTION INTRAVENOUS at 09:54

## 2024-09-23 RX ADMIN — PROPOFOL 170 MG: 10 INJECTION, EMULSION INTRAVENOUS at 09:59

## 2024-09-23 RX ADMIN — ROCURONIUM BROMIDE 40 MG: 50 INJECTION, SOLUTION INTRAVENOUS at 10:28

## 2024-09-23 ASSESSMENT — ACTIVITIES OF DAILY LIVING (ADL)
ADLS_ACUITY_SCORE: 35

## 2024-09-23 ASSESSMENT — ENCOUNTER SYMPTOMS: DYSRHYTHMIAS: 1

## 2024-09-23 NOTE — ANESTHESIA PROCEDURE NOTES
Airway       Patient location during procedure: OR       Procedure Start/Stop Times: 9/23/2024 10:01 AM  Staff -        CRNA: Dell Farrell APRN CRNA       Performed By: CRNAIndications and Patient Condition       Indications for airway management: milton-procedural       Induction type:intravenous       Mask difficulty assessment: 0 - not attempted    Final Airway Details       Final airway type: endotracheal airway       Successful airway: ETT - single  Endotracheal Airway Details        ETT size (mm): 7.0       Cuffed: yes       Cuff volume (mL): 7       Successful intubation technique: video laryngoscopy       VL Blade Size: Glidescope 3       Grade View of Cords: 1       Adjucts: stylet       Position: Right       Measured from: lips       Secured at (cm): 22       Bite block used: None    Post intubation assessment        Placement verified by: capnometry, equal breath sounds and chest rise        Number of attempts at approach: 1       Secured with: tape       Ease of procedure: easy       Dentition: Intact and Unchanged    Medication(s) Administered   Medication Administration Time: 9/23/2024 10:01 AM

## 2024-09-23 NOTE — DISCHARGE INSTRUCTIONS
[facility]  Cardiac Electrophysiology  [address]  [place] [address]   Office: [phone]  Fax: [phone]       Cardiac Electrophysiology - Post Ablation Discharge Instructions      PROCEDURE   Atrial fibrillation ablation         MEDICATION INSTRUCTIONS   Continue taking your prior to procedure medications, including metoprolol for now.  Continue taking your blood thinner rivaroxaban (Xarelto).          DISCHARGE INSTRUCTIONS   Medications   Take your medications as prescribed.   It is important for you to continue your blood thinner without interruption, unless your electrophysiologist instructs you otherwise.     General instructions   Have an adult stay with you until tomorrow.   You may resume your normal diet.     You may shower tomorrow.  Do not take a bath, or use a hot tub or pool for at least 1 week.    Activity recommendations   Do not drive for 3 days.   Avoid stooping or squatting more than 90 degrees at the hips for 7 days.   Avoid repetitive motions such as loading , vacuuming, raking or shoveling for 7 days.   Avoid heavy lifting (greater than 25lbs) for 7 days.       Groin care instructions   For the first 24 hours after your ablation, check the groin access sites every 1-2 hours while awake.   You may keep a bandaid over the puncture sites for 1 or 2 days post-procedure and thereafter may keep these sites uncovered.  Change the bandaid daily.  If there is minor oozing, apply another bandaid and remove it after 12 hours.   For 2 days, when you cough, sneeze, laugh or move your bowels, hold your hand over the puncture sites and press firmly.   Do not scrub the groin access sites.   Do not use lotion or powder near the groin access sites.       Arrhythmias following ablation  Recurrent atrial fibrillation and palpitations are common within the first 3 months post ablation while your heart recovers from the procedure.  These are usually more  frequent in the first few weeks following ablation and should occur less frequently over time.  Please contact us if you are having frequent or long lasting atrial fibrillation episodes following ablation.    Common findings after ablation  Bruising and a dime or pea size lump at the access sites is common.  If you notice increased swelling, external bleeding, or have other concerns regarding your groin access sites please call your electrophysiology team's office, and if after hours consider emergency department evaluation.   Soreness and mild pain at your groin sites is normal, to help relieve this pain you can apply ice/cold packs to the sites for 20min 3-4 times per day.   Pleuritic chest discomfort (chest pain worse with taking deep breaths, worse with laying flat on your back) can occur after ablation, usually coming about within the first 24-72hrs post ablation.  If this occurs and is severe enough to be troublesome to you, please call us and consider starting a course of ibuprofen 400mg three times daily for 5 to 7 days.     Things to watch for   As with any type of procedure, please be more attentive to unusual symptoms post ablation (eg. fever, neurologic changes, pain with swallowing, loss of consciousness, etc) - we recommend ER evaluation for any such symptoms in the first few weeks post procedure.       Contact the EP Nurse line with any of the following.  Contact the cardiology on-call number after business hours.    Consider ER evaluation for severe symptoms.   Groin pain, swelling, or growing hard lump around the puncture sites.   Groin redness, tenderness, swelling, or drainage (blood or pus).   Neurological changes (for example: leg, arm or face weakness or numbness, difficulties with speech or word finding, problems walking or with your balance, vision changes).   Any numbness, coolness or changes in color in your extremities.  Sudden onset severe abdominal or back pain.  Moderate or severe chest  pain not relieved by Tylenol or Ibuprofen, particularly after the first 48 hours.   Shortness of breath.   Chills or fever greater than 100 F.   Difficulty swallowing food or liquids.  Coughing up blood.   Blood in your stool.  Nausea and vomiting.  Difficulty urinating.  Recurrent atrial fibrillation associated with prolonged rapid heart rates (for example, heart rates over 140bpm for greater than 4 hours) or associated with additional concerning symptoms (for example, chest pain, lightheadedness, loss of consciousness, sweating).     If you are being evaluated at an emergency department, please tell your ER doctor that you have recently underwent an atrial fibrillation ablation and ask the ER to contact our office.  Many special considerations apply following ablation - these may be overlooked during an ER evaluation.      Our office will have a follow-up visit scheduled for you in approximately 6 weeks.  Please do not hesitate to call us before that time should issues arise.         [facility]      To reach the EP nurses working with Dr. Johnson:   [phone]        To reach the general Heart Care Clinic line or after hours service:   [phone]   If you are calling after hours, please listen to the entire voicemail,    a live  will answer at the end of the message.

## 2024-09-23 NOTE — ANESTHESIA CARE TRANSFER NOTE
Patient: Summer Kendrick    Procedure: Procedure(s):  Ablation Atrial Fibrillation       Diagnosis: Atrial Fibrillation with Rapid Ventricular Rate  Diagnosis Additional Information: No value filed.    Anesthesia Type:   General     Note:    Oropharynx: oropharynx clear of all foreign objects  Level of Consciousness: drowsy  Oxygen Supplementation: room air    Independent Airway: airway patency satisfactory and stable  Dentition: dentition unchanged  Vital Signs Stable: post-procedure vital signs reviewed and stable  Report to RN Given: handoff report given  Patient transferred to: Cardiac Special Care          Vitals:  Vitals Value Taken Time   /55    Temp 97.4    Pulse 75    Resp 16    SpO2 95        Electronically Signed By: DOMITILA Gandara CRNA  September 23, 2024  11:21 AM

## 2024-09-23 NOTE — ANESTHESIA POSTPROCEDURE EVALUATION
Patient: Summer Kendrick    Procedure: Procedure(s):  Ablation Atrial Fibrillation       Anesthesia Type:  General    Note:  Disposition: Outpatient   Postop Pain Control: Uneventful            Sign Out: Well controlled pain   PONV: No   Neuro/Psych: Uneventful            Sign Out: Acceptable/Baseline neuro status   Airway/Respiratory: Uneventful            Sign Out: Acceptable/Baseline resp. status   CV/Hemodynamics: Uneventful            Sign Out: Acceptable CV status; No obvious hypovolemia; No obvious fluid overload   Other NRE: NONE   DID A NON-ROUTINE EVENT OCCUR? No    Event details/Postop Comments:  Doing well and without complaints. No nausea or vomiting!       Last vitals:  Vitals Value Taken Time   /64 09/23/24 1245   Temp     Pulse 65 09/23/24 1248   Resp 16 09/23/24 1142   SpO2 98 % 09/23/24 1248   Vitals shown include unfiled device data.    Electronically Signed By: Enrique Patrick MD  September 23, 2024  12:49 PM

## 2024-09-23 NOTE — PROGRESS NOTES
Patient discharged home with her  in stable condition. Patient and  verbalize understanding of discharge instructions and follow up appointment. No questions or concerns at time of discharge.

## 2024-09-23 NOTE — ANESTHESIA PREPROCEDURE EVALUATION
Anesthesia Pre-Procedure Evaluation    Patient: Summer Kendrick   MRN: [id number] : 1955        Procedure : Procedure(s):  Ablation Atrial Fibrillation          No past medical history on file.   Past Surgical History:   Procedure Laterality Date    ZZC REPR ASD W BYPASS      Description: Repair Of Atrial Secundum Septal Defect;  Recorded: 2014;      Allergies   Allergen Reactions    Nickel Unknown    Penicillins Rash      Social History     Tobacco Use    Smoking status: Never    Smokeless tobacco: Never   Substance Use Topics    Alcohol use: Not Currently     Alcohol/week: 1.0 standard drink of alcohol     Types: 1 Standard drinks or equivalent per week     Comment: Alcoholic Drinks/day: Ocasional      Wt Readings from Last 1 Encounters:   24 68.6 kg (151 lb 3.2 oz)        Anesthesia Evaluation   Pt has had prior anesthetic. Type: General.    No history of anesthetic complications       ROS/MED HX  ENT/Pulmonary:  - neg pulmonary ROS     Neurologic:     (+)      migraines,                          Cardiovascular: Comment: Cardiomegaly with enlarged RV NOS.  Hx of Secundum ASD    (+)  - -   -  - -                        dysrhythmias, a-fib, Irregular Heartbeat/Palpitations, valvular problems/murmurs type: MR and AI TR. congenital heart disease ASD; closure . pulmonary hypertension,      METS/Exercise Tolerance:     Hematologic:  - neg hematologic  ROS     Musculoskeletal:  - neg musculoskeletal ROS     GI/Hepatic:  - neg GI/hepatic ROS     Renal/Genitourinary:  - neg Renal ROS     Endo:  - neg endo ROS  (-) obesity   Psychiatric/Substance Use:  - neg psychiatric ROS     Infectious Disease:  - neg infectious disease ROS     Malignancy:  - neg malignancy ROS     Other:  - neg other ROS          Physical Exam    Airway        Mallampati: II   TM distance: > 3 FB   Neck ROM: full   Mouth opening: > 3 cm    Respiratory Devices and Support         Dental       (+) Minor Abnormalities - some  "fillings, tiny chips      Cardiovascular   cardiovascular exam normal          Pulmonary   pulmonary exam normal                OUTSIDE LABS:  CBC:   Lab Results   Component Value Date    WBC 6.1 09/16/2024    WBC 8.0 03/31/2024    HGB 14.5 09/16/2024    HGB 15.3 03/31/2024    HCT 43.7 09/16/2024    HCT 46.8 03/31/2024     09/16/2024     03/31/2024     BMP:   Lab Results   Component Value Date     09/16/2024     03/31/2024    POTASSIUM 4.4 09/16/2024    POTASSIUM 4.2 03/31/2024    CHLORIDE 103 09/16/2024    CHLORIDE 106 03/31/2024    CO2 23 09/16/2024    CO2 27 03/31/2024    BUN 19.3 09/16/2024    BUN 16.6 03/31/2024    CR 0.92 09/16/2024    CR 1.04 (H) 03/31/2024    GLC 98 09/16/2024     (H) 03/31/2024     COAGS: No results found for: \"PTT\", \"INR\", \"FIBR\"  POC: No results found for: \"BGM\", \"HCG\", \"HCGS\"  HEPATIC:   Lab Results   Component Value Date    ALBUMIN 4.5 11/17/2023    PROTTOTAL 7.3 11/17/2023    ALT 15 11/17/2023    AST 24 11/17/2023    ALKPHOS 78 11/17/2023    BILITOTAL 0.5 11/17/2023     OTHER:   Lab Results   Component Value Date    A1C 5.5 04/22/2014    JAIME 9.5 09/16/2024    MAG 2.3 03/31/2024    TSH 3.08 11/17/2023       Anesthesia Plan    ASA Status:  3    NPO Status:  NPO Appropriate    Anesthesia Type: General.     - Airway: ETT   Induction: Intravenous, Propofol.   Maintenance: Balanced.   Techniques and Equipment:     - Airway: Video-Laryngoscope     - Lines/Monitors: 2nd IV     Consents    Anesthesia Plan(s) and associated risks, benefits, and realistic alternatives discussed. Questions answered and patient/representative(s) expressed understanding.     - Discussed:     - Discussed with:  Patient      - Extended Intubation/Ventilatory Support Discussed: No.      - Patient is DNR/DNI Status: No     Use of blood products discussed: Yes.     - Discussed with: Patient.     - Consented: consented to blood products     Postoperative Care    Pain management: IV " analgesics, Oral pain medications, Multi-modal analgesia.   PONV prophylaxis: Dexamethasone or Solumedrol, Ondansetron (or other 5HT-3)     Comments:    Other Comments: 2 PIV.  Ephedrine; PE infusion PRN.  Decadron, Zofran.  Versed, Fentanyl.           Enrique Patrikc MD    I have reviewed the pertinent notes and labs in the chart from the past 30 days and (re)examined the patient.  Any updates or changes from those notes are reflected in this note.

## 2024-09-25 ENCOUNTER — TELEPHONE (OUTPATIENT)
Dept: CARDIOLOGY | Facility: CLINIC | Age: 69
End: 2024-09-25
Payer: COMMERCIAL

## 2024-09-25 NOTE — TELEPHONE ENCOUNTER
PC from pt  Pt is s/p PVI from 9/23  Pt notes that last night she had some chest discomfort, that was preventing her from sleeping  Chest pressure and pain, pt described normal symptom during recovery  She took 2 ibuprofen alternating with tylenol for relief, which pt reports helped  She reports significant improvement this AM  Pt denies any further concerns or issues at this time  Advised pt that she can continue with PRN OTC ibuprofen for pain for the next few days for pain relief  Pt is to call if pain persists with ibuprofen, or gets worse  She has her PO PC tomorrow on 9/26, will follow-up with pt at that time as well  Pt verbalized understanding, has no further questions or concerns at this time, and has our contact information if needed.  9/25/2024 9:11 AM  Elyssa De Los Santos RN

## 2024-09-26 ENCOUNTER — VIRTUAL VISIT (OUTPATIENT)
Dept: CARDIOLOGY | Facility: CLINIC | Age: 69
End: 2024-09-26
Payer: COMMERCIAL

## 2024-09-26 DIAGNOSIS — I48.0 PAROXYSMAL ATRIAL FIBRILLATION (H): Primary | ICD-10-CM

## 2024-09-26 PROCEDURE — 99207 PR NO CHARGE NURSE ONLY: CPT | Mod: 93

## 2024-09-26 NOTE — PROGRESS NOTES
Post PVI Procedural Follow Up Call    Pt is s/p PVI from 9/23/24 with Dr Johnson  PC was placed to pt, spoke to pt    General Assessment:     Weight: Pt reports pt is unable to report weight, but denies s/s of fluid retention    Pain: Pt denies generalized or localized pain abnormal to healing s/p and Pt reports mild to moderate pain at groin sites, reviewed with pt on how to mitigate pain at home s/p during recovery (ice, rest, OTC tylenol)     /GI: Pt denies difficulty swallowing, denies constipation, denies urinary retention/difficulty, reports no s/s of infection, report normal appetite, and reports staying hydrated.    Neurological: Pt Denies any neurological changes, or s/s of CVA    Respiratory: Pt denies SOB, denies difficulty breathing, denies throat pain, denies changes/abnormal sputum, and denies any further symptoms abnormal to normal healing process s/p PVI.    Activity: Pt is tolerating advancement in activity while following physical restrictions, staying well hydrated, and gradually working into baseline activity.     Rhythm Assessment:   Pt reports occasional palpitations normal to PVI recovery, denies irregularities in HR or rhythm, and denies symptoms or sustained AF episodes.    Procedure Site Assessment:   Pts some bruising around sites without significant change from hospital discharge and normal to PVI recovery    Anticoagulation/Medication:  Pt remain on Xarelto without interruption  Per guidelines by Dr Johnson no ASA needed upon discharge    Education completed with pt at this visit:  Reviewed normal post-op PVI healing process, when to contact EP-RN/EP-MD, contact information was given to the pt for further concerns or questions, and pt verbalized understanding    Follow up  Pts AVS was printed and mailed to pt by scheduling team and pt will be seen by EP NP at 6 wks, monitor will be ordered at this follow-up if indicated as well as 3mo follow-up with either EP ASHKAN or LOAN REID    9/26/2024  9:45 AM  Georgia Abrams RN

## 2024-09-26 NOTE — PATIENT INSTRUCTIONS
Instructions Following your Ablation Procedure    Your anticoagulation medication Xarelto:  It is important to remain on your anticoagulation medication uninterrupted after your ablation to reduce your risk of a stroke or heart attack, do not stop this medication  Please contact me if you have any questions regarding your anticoagulation medication    Groin care instructions  Keep the site clean and dry, do not place a bandage over the site. If there is minor oozing, apply another bandaid and remove it after 12 hours.  Mild bruising at the access sites is normal. If you notice increased swelling, external bleeding, or have other concerns regarding your access sites please consider emergency department evaluation for significant changes and call your electrophysiology team's office.  You may experience mild discomfort at your groin sites, applying ice packs 20min 3-4 times a day can help alleviate this discomfort.    Activity recommendations  You can resume driving.  Avoid stooping or squatting more than 90 degrees at the hips, repetitive motions such as loading , vacuuming, raking or shoveling, and heavy lifting (greater than 25lbs) for 1 week  Increase your activity gradually over the next 5-10 days, working back to your normal daily activity/routine.    Post ablation instructions  Stay well hydrated, and increase your fluid intake during this recovery period  High protein foods aide in your bodies healing process  You may have some irregular heartbeats and/or atrial fibrillation following your ablation which is normal to recovery, these episodes should occur less frequently over time.   Recurrent atrial fibrillation can occur within the first 3 months post ablation while your heart recovers from the procedure. Please call the electrophysiology team's office if you have an episode lasting greater than 4 hours, or if you notice the episodes are increasing in frequency or duration  Pleuritic chest  discomfort (chest pain worse with taking deep breaths, worse with laying flat on your back) can occur after ablation, usually coming about within the first 24-48hrs post ablation. If this occurs and is severe enough to be troublesome to you, please call us and consider starting a course of ibuprofen 400mg three times daily for 5 to 7 days    Things to watch for  As with any type of procedure, please be more attentive to unusual symptoms post ablation (eg. fever, neurologic changes, pain with swallowing, loss of consciousness, etc) - we recommend ER evaluation for any such symptoms in the first few weeks post procedure.    Consider ER evaluation for the following:  Severe chest pain not relieved by Tylenol or Ibuprofen  You have chills or a fever greater than 101 F (38 C)  Neurologic changes (eg. leg, arm or face weakness or numbness, difficulties with speech or word finding, problems  walking or with your balance, vision changes)  Severe difficulty swallowing and/or you are coughing up blood  Shortness of breath  Increased groin pain or a large or growing hard lump around the site  Groin is red, swollen, hot or tender  Blood or fluid is draining from the groin site  Any numbness, coolness or changes in color in your extremities  Groin pain not relieved by Tylenol or Advil  Recurrent atrial fibrillation associated with sustained rapid heart rates or associated with additional concerning  symptoms.    Your follow up appointments are as follows:  You will be seen by the electrophysiologist nurse practitioner at 6 weeks after your ablation  At your 6 week appointment, a 3 month follow-up appointment will be arranged with either the Nurse Practitioner or the Electrophysiology provider     Sincerely,  Georgia Abrams RN [phone]    After hours please contact the on call service at # [phone]

## 2024-10-02 ENCOUNTER — PATIENT OUTREACH (OUTPATIENT)
Dept: GASTROENTEROLOGY | Facility: CLINIC | Age: 69
End: 2024-10-02
Payer: COMMERCIAL

## 2024-10-18 ENCOUNTER — PATIENT OUTREACH (OUTPATIENT)
Dept: CARE COORDINATION | Facility: CLINIC | Age: 69
End: 2024-10-18
Payer: COMMERCIAL

## 2024-10-18 ENCOUNTER — DOCUMENTATION ONLY (OUTPATIENT)
Dept: OTHER | Facility: CLINIC | Age: 69
End: 2024-10-18
Payer: COMMERCIAL

## 2024-10-24 ENCOUNTER — ANCILLARY PROCEDURE (OUTPATIENT)
Dept: MAMMOGRAPHY | Facility: HOSPITAL | Age: 69
End: 2024-10-24
Attending: FAMILY MEDICINE
Payer: COMMERCIAL

## 2024-10-24 DIAGNOSIS — Z12.31 VISIT FOR SCREENING MAMMOGRAM: ICD-10-CM

## 2024-10-24 PROCEDURE — 77063 BREAST TOMOSYNTHESIS BI: CPT

## 2024-11-14 NOTE — PROGRESS NOTES
[facility]  Cardiac Electrophysiology  [address]  [place] [address]   Office: [phone]  Fax: [phone]     HEART CARE ELECTROPHYSIOLOGY NOTE     Primary Care: Lorie Ladd MD       Assessment/Recommendations     Paroxysmal atrial fibrillation/stage 3D: Symptomatic with palpitation.  Status post PVI 9/23/2024.  Unremarkable recovery without symptomatology or documentation of recurrent arrhythmia.  We discussed ablation results and prognosis    [id number] score of 2 for age 65-74, gender    Status post ASD repair 2014    Plan:  Continue Xarelto 20 mg daily with dinner for stroke prophylaxis  Decrease metoprolol to 25 mg daily x 7 days then stop  After stopping metoprolol resume atenolol 12.5 mg daily  Follow-up 6 weeks     History of Present Illness/Subjective    Summer Kendrick is a 69 year old female with past medical history significant for paroxysmal atrial fibrillation, secundum ASD status post repair 2014, seen today status post PVI 9/23/2024. No additional arrhythmias were induced. She initially had some chest and lower rib tightness for a few days which resolved with ibuprofen. She had a self-limiting episode of atrial fibrillation occurring 2 weeks after ablation, lasting around 30 minutes and none since.  She denies groin site issues, heartburn, swallowing or neurologic changes post ablation.  She denies palpitations, dyspnea on exertion or rest, lightheadedness/dizziness, pedal edema or syncope.     Data Review     Arrhythmia hx:  Sx: Palpitations  Sx onset: 2014  Dx/date: Postoperative AF 2014 following ASD repair, clinical recurrence 3/2024  Rate control: Metoprolol  AAD: Amiodarone (2014)  DCCV: none  Ablation: PVI 9/23/2024 (Dr. Johnson)  [id number] 2 for age 65-74, gender  OAC: Rivaroxaban    EKG   9/23/2024: SR 69 bpm, QRS 92 ms, QT/QTc 450/486 ms  9/16/2024 SR 71 bpm, QRS 70 ms, QT/QTc 400/434 ms  Personally reviewed.     TTE  4/15/2024  Left ventricular size, wall motion and function are normal. The ejection  fraction is 60-65%.  The right ventricle is mild to moderately dilated.  The left atrium is moderate to severely dilated.  The right atrium is moderate to severely dilated.  There is no color Doppler evidence of an atrial shunt.  IVC diameter <2.1 cm collapsing >50% with sniff suggests a normal RA pressure  of 3 mmHg.  There is mild (1+) aortic regurgitation.    [facility] 7/2024  Indication for study: Atrial fibrillation  Time monitored: 13 days 21 hours.    Predominant rhythm: Normal sinus rhythm.  Conduction intervals are normal.  Patient recordings: Diary: one (irregular beats), and recordings demonstrated normal sinus rhythm with heart rates in the upper 60s and a single atrial couplet.  Triggered (no symptoms): six, and recordings demonstrated normal sinus rhythm with heart rates ranging between 57 and 86 bpm.  There was no ectopy or other pathologic rhythm disturbance.  Auto triggered recordings: recordings demonstrated sinus rhythm with occasional atrial and ventricular ectopy.  Impression:  Essentially normal multiday cardiac patch monitor.  A single symptomatic recording did not correlate with any significant arrhythmia.  No sustained atrial or ventricular tachyarrhythmia.  No profound bradycardia or significant/symptomatic pauses.    I have reviewed and updated the patient's past medical history, allergies and medication list.               Physical Examination Review of Systems   BMI= There is no height or weight on file to calculate BMI.    Wt Readings from Last 3 Encounters:   09/23/24 68.5 kg (151 lb)   09/16/24 68.6 kg (151 lb 3.2 oz)   06/26/24 67.5 kg (148 lb 14.4 oz)       Vitals: LMP  (LMP Unknown)   General   Appearance:   Alert and oriented, no acute distress   HEENT:  Normocephalic and atraumatic   Neck: No JVP, carotid bruit or obvious thyromegaly   Lungs:   Respirations unlabored   Cardiovascular:   Rhythm is  regular. S1 and S2 are normal. No significant murmur is present   Extremities: No cyanosis or clubbing   Skin: Skin is warm, dry, and otherwise intact   Neurologic: Gait not assessed. Mood and affect appropriate                                                   Medical History  Surgical History Family History Social History   No past medical history on file. Past Surgical History:   Procedure Laterality Date    EP ABLATION PULMONARY VEIN ISOLATION N/A 9/23/2024    Procedure: Ablation Atrial Fibrillation;  Surgeon: Phil Johnson MD;  Location: [facility] LAB CV    ZZC REPR ASD W BYPASS      Description: Repair Of Atrial Secundum Septal Defect;  Recorded: 06/03/2014;    Family History   Problem Relation Age of Onset    Breast Cancer Mother 55    Colon Cancer Maternal Grandfather 85    Social History     Tobacco Use    Smoking status: Never    Smokeless tobacco: Never   Substance Use Topics    Alcohol use: Not Currently     Alcohol/week: 1.0 standard drink of alcohol     Types: 1 Standard drinks or equivalent per week     Comment: Alcoholic Drinks/day: Ocasional    Drug use: No          Medications  Allergies   Current Outpatient Medications   Medication Sig Dispense Refill    cetirizine (ZYRTEC ALLERGY) 10 MG tablet Take 10 mg by mouth daily      cholecalciferol (VITAMIN D3) 10 mcg (400 units) TABS tablet       metoprolol tartrate (LOPRESSOR) 25 MG tablet Take 1 tablet (25 mg) by mouth 2 times daily 180 tablet 2    multivitamin w/minerals (MULTI-VITAMIN) tablet Take 1 tablet by mouth daily. 1/2 TABLET      rivaroxaban ANTICOAGULANT (XARELTO) 20 MG TABS tablet Take 1 tablet (20 mg) by mouth daily (with dinner) 90 tablet 3    Allergies   Allergen Reactions    Nickel Unknown    Penicillins Rash         Lab Results    Chemistry/lipid CBC Cardiac Enzymes/BNP/TSH/INR   Lab Results   Component Value Date    BUN 19.3 09/16/2024     09/16/2024    CO2 23 09/16/2024     No results found for:  "\"CREATININE\"    Lab Results   Component Value Date    CHOL 204 (H) 2023    HDL 69 2023     (H) 2023      Lab Results   Component Value Date    WBC 6.1 2024    HGB 14.5 2024    HCT 43.7 2024    MCV 89 2024     2024    Lab Results   Component Value Date    TSH 3.08 2023        20 minutes spent reviewing prior records (including documentation, laboratory studies, cardiac testing/imaging), history and physical exam, planning, and subsequent documentation.     The longitudinal plan of care for the diagnosis(es)/condition(s) as documented were addressed during this visit. Due to the added complexity in care, I will continue to support Summer in the subsequent management and with ongoing continuity of care.     This note has been dictated using voice recognition software. Any grammatical, typographical, or context distortions are unintentional and inherent to the software.    Ana Harris, CNP  Clinical Cardiac Electrophysiology  [facility]  Clinic and [phone]  Fax: [phone]  Electrophysiology Nurses: [phone]                                 "

## 2024-11-18 ENCOUNTER — OFFICE VISIT (OUTPATIENT)
Dept: CARDIOLOGY | Facility: CLINIC | Age: 69
End: 2024-11-18
Payer: COMMERCIAL

## 2024-11-18 VITALS
BODY MASS INDEX: 22.58 KG/M2 | HEIGHT: 68 IN | DIASTOLIC BLOOD PRESSURE: 80 MMHG | SYSTOLIC BLOOD PRESSURE: 142 MMHG | HEART RATE: 87 BPM | RESPIRATION RATE: 16 BRPM | WEIGHT: 149 LBS

## 2024-11-18 DIAGNOSIS — I48.0 PAROXYSMAL ATRIAL FIBRILLATION (H): Primary | ICD-10-CM

## 2024-11-18 DIAGNOSIS — Q21.11 OSTIUM SECUNDUM TYPE ATRIAL SEPTAL DEFECT: ICD-10-CM

## 2024-11-18 DIAGNOSIS — Z98.890 STATUS POST CATHETER ABLATION OF ATRIAL FIBRILLATION: ICD-10-CM

## 2024-11-18 PROCEDURE — G2211 COMPLEX E/M VISIT ADD ON: HCPCS | Performed by: NURSE PRACTITIONER

## 2024-11-18 PROCEDURE — 99214 OFFICE O/P EST MOD 30 MIN: CPT | Performed by: NURSE PRACTITIONER

## 2024-11-18 RX ORDER — ATENOLOL 25 MG/1
12.5 TABLET ORAL DAILY
Qty: 45 TABLET | Refills: 3 | Status: SHIPPED | OUTPATIENT
Start: 2024-11-18

## 2024-11-18 NOTE — LETTER
11/18/2024    LORIE LADD MD  [address] [address]    RE: Summer Kendrick       Dear Colleague,     I had the pleasure of seeing Summer Kendrick in the [facility].       [facility]  Cardiac Electrophysiology  [address]  [place] [address]   Office: [phone]  Fax: [phone]     HEART CARE ELECTROPHYSIOLOGY NOTE     Primary Care: Lorie Ladd MD       Assessment/Recommendations     Paroxysmal atrial fibrillation/stage 3D: Symptomatic with palpitation.  Status post PVI 9/23/2024.  Unremarkable recovery without symptomatology or documentation of recurrent arrhythmia.  We discussed ablation results and prognosis    [id number] score of 2 for age 65-74, gender    Status post ASD repair 2014    Plan:  Continue Xarelto 20 mg daily with dinner for stroke prophylaxis  Decrease metoprolol to 25 mg daily x 7 days then stop  After stopping metoprolol resume atenolol 12.5 mg daily  Follow-up 6 weeks     History of Present Illness/Subjective    Summer Kendrick is a 69 year old female with past medical history significant for paroxysmal atrial fibrillation, secundum ASD status post repair 2014, seen today status post PVI 9/23/2024. No additional arrhythmias were induced. She initially had some chest and lower rib tightness for a few days which resolved with ibuprofen. She had a self-limiting episode of atrial fibrillation occurring 2 weeks after ablation, lasting around 30 minutes and none since.  She denies groin site issues, heartburn, swallowing or neurologic changes post ablation.  She denies palpitations, dyspnea on exertion or rest, lightheadedness/dizziness, pedal edema or syncope.     Data Review     Arrhythmia hx:  Sx: Palpitations  Sx onset: 2014  Dx/date: Postoperative AF 2014 following ASD repair, clinical recurrence 3/2024  Rate control: Metoprolol  AAD: Amiodarone (2014)  DCCV: none  Ablation: PVI 9/23/2024  (Dr. Johnson)  [id number] 2 for age 65-74, gender  OAC: Rivaroxaban    EKG   9/23/2024: SR 69 bpm, QRS 92 ms, QT/QTc 450/486 ms  9/16/2024 SR 71 bpm, QRS 70 ms, QT/QTc 400/434 ms  Personally reviewed.     TTE 4/15/2024  Left ventricular size, wall motion and function are normal. The ejection  fraction is 60-65%.  The right ventricle is mild to moderately dilated.  The left atrium is moderate to severely dilated.  The right atrium is moderate to severely dilated.  There is no color Doppler evidence of an atrial shunt.  IVC diameter <2.1 cm collapsing >50% with sniff suggests a normal RA pressure  of 3 mmHg.  There is mild (1+) aortic regurgitation.    [facility] 7/2024  Indication for study: Atrial fibrillation  Time monitored: 13 days 21 hours.    Predominant rhythm: Normal sinus rhythm.  Conduction intervals are normal.  Patient recordings: Diary: one (irregular beats), and recordings demonstrated normal sinus rhythm with heart rates in the upper 60s and a single atrial couplet.  Triggered (no symptoms): six, and recordings demonstrated normal sinus rhythm with heart rates ranging between 57 and 86 bpm.  There was no ectopy or other pathologic rhythm disturbance.  Auto triggered recordings: recordings demonstrated sinus rhythm with occasional atrial and ventricular ectopy.  Impression:  Essentially normal multiday cardiac patch monitor.  A single symptomatic recording did not correlate with any significant arrhythmia.  No sustained atrial or ventricular tachyarrhythmia.  No profound bradycardia or significant/symptomatic pauses.    I have reviewed and updated the patient's past medical history, allergies and medication list.               Physical Examination Review of Systems   BMI= There is no height or weight on file to calculate BMI.    Wt Readings from Last 3 Encounters:   09/23/24 68.5 kg (151 lb)   09/16/24 68.6 kg (151 lb 3.2 oz)   06/26/24 67.5 kg (148 lb 14.4 oz)       Vitals: LMP  (LMP Unknown)   General    Appearance:   Alert and oriented, no acute distress   HEENT:  Normocephalic and atraumatic   Neck: No JVP, carotid bruit or obvious thyromegaly   Lungs:   Respirations unlabored   Cardiovascular:   Rhythm is regular. S1 and S2 are normal. No significant murmur is present   Extremities: No cyanosis or clubbing   Skin: Skin is warm, dry, and otherwise intact   Neurologic: Gait not assessed. Mood and affect appropriate                                                   Medical History  Surgical History Family History Social History   No past medical history on file. Past Surgical History:   Procedure Laterality Date     EP ABLATION PULMONARY VEIN ISOLATION N/A 9/23/2024    Procedure: Ablation Atrial Fibrillation;  Surgeon: Phil Johnson MD;  Location: [facility] LAB CV     ZZC REPR ASD W BYPASS      Description: Repair Of Atrial Secundum Septal Defect;  Recorded: 06/03/2014;    Family History   Problem Relation Age of Onset     Breast Cancer Mother 55     Colon Cancer Maternal Grandfather 85    Social History     Tobacco Use     Smoking status: Never     Smokeless tobacco: Never   Substance Use Topics     Alcohol use: Not Currently     Alcohol/week: 1.0 standard drink of alcohol     Types: 1 Standard drinks or equivalent per week     Comment: Alcoholic Drinks/day: Ocasional     Drug use: No          Medications  Allergies   Current Outpatient Medications   Medication Sig Dispense Refill     cetirizine (ZYRTEC ALLERGY) 10 MG tablet Take 10 mg by mouth daily       cholecalciferol (VITAMIN D3) 10 mcg (400 units) TABS tablet        metoprolol tartrate (LOPRESSOR) 25 MG tablet Take 1 tablet (25 mg) by mouth 2 times daily 180 tablet 2     multivitamin w/minerals (MULTI-VITAMIN) tablet Take 1 tablet by mouth daily. 1/2 TABLET       rivaroxaban ANTICOAGULANT (XARELTO) 20 MG TABS tablet Take 1 tablet (20 mg) by mouth daily (with dinner) 90 tablet 3    Allergies   Allergen Reactions     Nickel Unknown      "Penicillins Rash         Lab Results    Chemistry/lipid CBC Cardiac Enzymes/BNP/TSH/INR   Lab Results   Component Value Date    BUN 19.3 2024     2024    CO2 23 2024     No results found for: \"CREATININE\"    Lab Results   Component Value Date    CHOL 204 (H) 2023    HDL 69 2023     (H) 2023      Lab Results   Component Value Date    WBC 6.1 2024    HGB 14.5 2024    HCT 43.7 2024    MCV 89 2024     2024    Lab Results   Component Value Date    TSH 3.08 2023        20 minutes spent reviewing prior records (including documentation, laboratory studies, cardiac testing/imaging), history and physical exam, planning, and subsequent documentation.     The longitudinal plan of care for the diagnosis(es)/condition(s) as documented were addressed during this visit. Due to the added complexity in care, I will continue to support Summer in the subsequent management and with ongoing continuity of care.     This note has been dictated using voice recognition software. Any grammatical, typographical, or context distortions are unintentional and inherent to the software.    Ana Harris CNP  Clinical Cardiac Electrophysiology  [facility]  Clinic and [phone]  Fax: [phone]  Electrophysiology Nurses: [phone]                                     Thank you for allowing me to participate in the care of your patient.      Sincerely,     DOMITILA Ellis CNP     [facility]  cc:   Phil Johnson MD  [address]  [place] [address]      "

## 2024-11-18 NOTE — PATIENT INSTRUCTIONS
-Decrease metoprolol to 25mg once a day x7 days then stop  -The next day start atenolol 12.5mg daily   -Follow-up 6 weeks

## 2024-11-27 SDOH — HEALTH STABILITY: PHYSICAL HEALTH: ON AVERAGE, HOW MANY DAYS PER WEEK DO YOU ENGAGE IN MODERATE TO STRENUOUS EXERCISE (LIKE A BRISK WALK)?: 6 DAYS

## 2024-11-27 SDOH — HEALTH STABILITY: PHYSICAL HEALTH: ON AVERAGE, HOW MANY MINUTES DO YOU ENGAGE IN EXERCISE AT THIS LEVEL?: 40 MIN

## 2024-11-27 ASSESSMENT — SOCIAL DETERMINANTS OF HEALTH (SDOH): HOW OFTEN DO YOU GET TOGETHER WITH FRIENDS OR RELATIVES?: TWICE A WEEK

## 2024-12-02 ENCOUNTER — OFFICE VISIT (OUTPATIENT)
Dept: FAMILY MEDICINE | Facility: CLINIC | Age: 69
End: 2024-12-02
Attending: FAMILY MEDICINE
Payer: COMMERCIAL

## 2024-12-02 VITALS
RESPIRATION RATE: 14 BRPM | BODY MASS INDEX: 23.23 KG/M2 | SYSTOLIC BLOOD PRESSURE: 113 MMHG | DIASTOLIC BLOOD PRESSURE: 76 MMHG | WEIGHT: 148 LBS | HEART RATE: 71 BPM | TEMPERATURE: 97.7 F | HEIGHT: 67 IN | OXYGEN SATURATION: 97 %

## 2024-12-02 DIAGNOSIS — I51.7 CARDIOMEGALY: ICD-10-CM

## 2024-12-02 DIAGNOSIS — Z00.00 ENCOUNTER FOR MEDICARE ANNUAL WELLNESS EXAM: Primary | ICD-10-CM

## 2024-12-02 DIAGNOSIS — I48.91 ATRIAL FIBRILLATION, UNSPECIFIED TYPE (H): ICD-10-CM

## 2024-12-02 LAB
ANION GAP SERPL CALCULATED.3IONS-SCNC: 11 MMOL/L (ref 7–15)
BUN SERPL-MCNC: 16.9 MG/DL (ref 8–23)
CALCIUM SERPL-MCNC: 10.5 MG/DL (ref 8.8–10.4)
CHLORIDE SERPL-SCNC: 103 MMOL/L (ref 98–107)
CHOLEST SERPL-MCNC: 205 MG/DL
CREAT SERPL-MCNC: 0.98 MG/DL (ref 0.51–0.95)
EGFRCR SERPLBLD CKD-EPI 2021: 63 ML/MIN/1.73M2
ERYTHROCYTE [DISTWIDTH] IN BLOOD BY AUTOMATED COUNT: 12 % (ref 10–15)
FASTING STATUS PATIENT QL REPORTED: YES
FASTING STATUS PATIENT QL REPORTED: YES
GLUCOSE SERPL-MCNC: 86 MG/DL (ref 70–99)
HCO3 SERPL-SCNC: 26 MMOL/L (ref 22–29)
HCT VFR BLD AUTO: 45 % (ref 35–47)
HDLC SERPL-MCNC: 70 MG/DL
HGB BLD-MCNC: 14.6 G/DL (ref 11.7–15.7)
LDLC SERPL CALC-MCNC: 115 MG/DL
MCH RBC QN AUTO: 28.9 PG (ref 26.5–33)
MCHC RBC AUTO-ENTMCNC: 32.4 G/DL (ref 31.5–36.5)
MCV RBC AUTO: 89 FL (ref 78–100)
NONHDLC SERPL-MCNC: 135 MG/DL
PLATELET # BLD AUTO: 284 10E3/UL (ref 150–450)
POTASSIUM SERPL-SCNC: 4.5 MMOL/L (ref 3.4–5.3)
RBC # BLD AUTO: 5.05 10E6/UL (ref 3.8–5.2)
SODIUM SERPL-SCNC: 140 MMOL/L (ref 135–145)
TRIGL SERPL-MCNC: 100 MG/DL
WBC # BLD AUTO: 5.8 10E3/UL (ref 4–11)

## 2024-12-02 PROCEDURE — 80048 BASIC METABOLIC PNL TOTAL CA: CPT | Performed by: FAMILY MEDICINE

## 2024-12-02 PROCEDURE — 36415 COLL VENOUS BLD VENIPUNCTURE: CPT | Performed by: FAMILY MEDICINE

## 2024-12-02 PROCEDURE — 80061 LIPID PANEL: CPT | Performed by: FAMILY MEDICINE

## 2024-12-02 PROCEDURE — G0439 PPPS, SUBSEQ VISIT: HCPCS | Performed by: FAMILY MEDICINE

## 2024-12-02 PROCEDURE — 99214 OFFICE O/P EST MOD 30 MIN: CPT | Mod: 25 | Performed by: FAMILY MEDICINE

## 2024-12-02 PROCEDURE — 85027 COMPLETE CBC AUTOMATED: CPT | Performed by: FAMILY MEDICINE

## 2024-12-02 ASSESSMENT — ENCOUNTER SYMPTOMS
PALPITATIONS: 0
COLOR CHANGE: 0
BACK PAIN: 0
DYSURIA: 0
HEMATURIA: 0
SORE THROAT: 0
FEVER: 0
EYE PAIN: 0
SHORTNESS OF BREATH: 0
SEIZURES: 0
CHILLS: 0
VOMITING: 0
ARTHRALGIAS: 0
ABDOMINAL PAIN: 0
COUGH: 0

## 2024-12-02 NOTE — PROGRESS NOTES
Preventive Care Visit  [facility]  RICKY ZUNIGA MD, Family Medicine  Dec 2, 2024      Assessment & Plan   Problem List Items Addressed This Visit       Right Ventricular Enlargement    Atrial Fibrillation    Relevant Orders    Lipid panel reflex to direct LDL Fasting    Basic metabolic panel  (Ca, Cl, CO2, Creat, Gluc, K, Na, BUN)    CBC with platelets (Completed)     Other Visit Diagnoses       Encounter for Medicare annual wellness exam    -  Primary           This is a 70 yo female here for AWV/physical exam.  She is generally healthy - spending time with grandchildren, exercising.    She has h/o atrial fibrillation/premature beats/palpitations - as well, has h/o atrial septal defect (s/p repair).  She is currently taking only Atenolol 12.5 mg daily (thinks she needs it for blood pressure - reviewed record - has very occasional elevated BP - today is well controlled).  Will check labs.  Continue current therapies.           Counseling  Appropriate preventive services were addressed with this patient via screening, questionnaire, or discussion as appropriate for fall prevention, nutrition, physical activity, Tobacco-use cessation, social engagement, weight loss and cognition.  Checklist reviewing preventive services available has been given to the patient.  Reviewed patient's diet, addressing concerns and/or questions.     Continue follow up with cardiology as warranted.        Oksana Wheeler is a 69 year old, presenting for the following:  Wellness Visit        12/2/2024     7:16 AM   Additional Questions   Roomed by hser   Accompanied by self           Had ablation  Did have one episode a couple weeks later - none since   Will be off the blood thinner in another month - doesn't have to take Sotalol  Drinking tons of water   Exercise - online zoom classes with [employer]         Health Care Directive  Patient has a Health Care Directive on file  No current  documents       11/27/2024   General Health   How would you rate your overall physical health? Good   Feel stress (tense, anxious, or unable to sleep) Not at all            11/27/2024   Nutrition   Diet: Regular (no restrictions)            11/27/2024   Exercise   Days per week of moderate/strenous exercise 6 days   Average minutes spent exercising at this level 40 min            11/27/2024   Social Factors   Frequency of gathering with friends or relatives Twice a week   Worry food won't last until get money to buy more No   Food not last or not have enough money for food? No   Do you have housing? (Housing is defined as stable permanent housing and does not include staying ouside in a car, in a tent, in an abandoned building, in an overnight shelter, or couch-surfing.) Yes   Are you worried about losing your housing? No   Lack of transportation? No   Unable to get utilities (heat,electricity)? No            11/27/2024   Fall Risk   Fallen 2 or more times in the past year? No     No    Trouble with walking or balance? No     No        Patient-reported    Multiple values from one day are sorted in reverse-chronological order          11/27/2024   Activities of Daily Living- Home Safety   Needs help with the following daily activites None of the above   Safety concerns in the home None of the above            11/27/2024   Dental   Dentist two times every year? Yes            11/27/2024   Hearing Screening   Hearing concerns? None of the above            11/27/2024   Driving Risk Screening   Patient/family members have concerns about driving No            11/27/2024   General Alertness/Fatigue Screening   Have you been more tired than usual lately? No            11/27/2024   Urinary Incontinence Screening   Bothered by leaking urine in past 6 months No            11/27/2024   TB Screening   Were you born outside of the [place]? No            Today's PHQ-2 Score:       12/2/2024     7:03 AM   PHQ-2 ( 1999 [employer])   Q1:  Little interest or pleasure in doing things 0    Q2: Feeling down, depressed or hopeless 0    PHQ-2 Score 0    Q1: Little interest or pleasure in doing things Not at all   Q2: Feeling down, depressed or hopeless Not at all   PHQ-2 Score 0       Patient-reported           11/27/2024   Substance Use   Alcohol more than 3/day or more than 7/wk Not Applicable   Do you have a current opioid prescription? No   How severe/bad is pain from 1 to 10? 1/10   Do you use any other substances recreationally? No        Social History     Tobacco Use    Smoking status: Never    Smokeless tobacco: Never   Substance Use Topics    Alcohol use: Not Currently     Alcohol/week: 1.0 standard drink of alcohol     Types: 1 Standard drinks or equivalent per week     Comment: Alcoholic Drinks/day: Ocasional    Drug use: No           10/24/2024   LAST FHS-7 RESULTS   1st degree relative breast or ovarian cancer Yes    Yes   Any relative bilateral breast cancer No   Any male have breast cancer No   Any ONE woman have BOTH breast AND ovarian cancer No   Any woman with breast cancer before 50yrs No   2 or more relatives with breast AND/OR ovarian cancer No   2 or more relatives with breast AND/OR bowel cancer Yes       Multiple values from one day are sorted in reverse-chronological order        Mammogram Screening - Mammogram every 1-2 years updated in Health Maintenance based on mutual decision making      History of abnormal Pap smear: No - age 65 or older with adequate negative prior screening test results (3 consecutive negative cytology results, 2 consecutive negative cotesting results, or 2 consecutive negative HrHPV test results within 10 years, with the most recent test occurring within the recommended screening interval for the test used)        11/11/2016    11:02 AM 9/15/2014     8:35 AM   PAP / HPV   PAP Negative for squamous intraepithelial lesion or malignancy  Electronically signed by Lucinda Alvarado CT (ASCP) on 11/21/2016 at   8:49 AM    Negative for squamous intraepithelial lesion or malignancy  Electronically signed by Lucinda Alvarado CT (ASCP) on 2014 at  3:08 PM        ASCVD Risk   The 10-year ASCVD risk score (Tere JOHNSON, et al., 2019) is: 5.1%    Values used to calculate the score:      Age: 69 years      Sex: Female      Is Non- : No      Diabetic: No      Tobacco smoker: No      Systolic Blood Pressure: 113 mmHg      Is BP treated: No      HDL Cholesterol: 69 mg/dL      Total Cholesterol: 204 mg/dL            Reviewed and updated as needed this visit by Provider                    Past Medical History:   Diagnosis Date    Arthritis 2022    Heart disease     ASD/repaired/congenital HD    History of blood transfusion 2014    after open heart surgery     Past Surgical History:   Procedure Laterality Date    COLONOSCOPY      EP ABLATION PULMONARY VEIN ISOLATION N/A 2024    Procedure: Ablation Atrial Fibrillation;  Surgeon: Phil Johnson MD;  Location[facility] CATH LAB CV    Z REPR ASD W BYPASS      Description: Repair Of Atrial Secundum Septal Defect;  Recorded: 2014;     OB History    Para Term  AB Living   3 3 3 0 0 0   SAB IAB Ectopic Multiple Live Births   0 0 0 0 0      # Outcome Date GA Lbr Gurjit/2nd Weight Sex Type Anes PTL Lv   3 Term            2 Term            1 Term              BP Readings from Last 3 Encounters:   24 113/76   24 (!) 142/80   24 123/66    Wt Readings from Last 3 Encounters:   24 67.1 kg (148 lb)   24 67.6 kg (149 lb)   24 68.5 kg (151 lb)                  Patient Active Problem List   Diagnosis    Allergies    Carpal Tunnel Syndrome    Right Ventricular Enlargement    Palpitations    Atrial Premature Complex    Atrial Septal Defect    Migraine Headache    Benign Adenomatous Polyp Of The Large Intestine    Atrial Fibrillation    Tachycardia    Post-menopausal bleeding    Blood in  urine    Atrial septal defect    Right ventricular enlargement     Past Surgical History:   Procedure Laterality Date    COLONOSCOPY  2012    EP ABLATION PULMONARY VEIN ISOLATION N/A 09/23/2024    Procedure: Ablation Atrial Fibrillation;  Surgeon: Phil Johnson MD;  Location: [facility] CATH LAB CV    ZC REPR ASD W BYPASS      Description: Repair Of Atrial Secundum Septal Defect;  Recorded: 06/03/2014;       Social History     Tobacco Use    Smoking status: Never    Smokeless tobacco: Never   Substance Use Topics    Alcohol use: Not Currently     Alcohol/week: 1.0 standard drink of alcohol     Types: 1 Standard drinks or equivalent per week     Comment: Alcoholic Drinks/day: Ocasional     Family History   Problem Relation Age of Onset    Breast Cancer Mother 55    Colon Cancer Maternal Grandfather 85         Current Outpatient Medications   Medication Sig Dispense Refill    atenolol (TENORMIN) 25 MG tablet Take 0.5 tablets (12.5 mg) by mouth daily. 45 tablet 3    cetirizine (ZYRTEC ALLERGY) 10 MG tablet Take 10 mg by mouth daily      cholecalciferol (VITAMIN D3) 10 mcg (400 units) TABS tablet       multivitamin w/minerals (MULTI-VITAMIN) tablet Take 1 tablet by mouth daily. 1/2 TABLET      rivaroxaban ANTICOAGULANT (XARELTO) 20 MG TABS tablet Take 1 tablet (20 mg) by mouth daily (with dinner) 90 tablet 3     Allergies   Allergen Reactions    Nickel Unknown    Penicillins Rash     Recent Labs   Lab Test 09/16/24  1000 03/31/24  0918 11/17/23  0852 10/02/23  1110 11/16/22  1404 10/25/21  0734 11/20/19  0930 11/20/19  0930 11/19/18  0859   LDL  --   --  111*  --  114* 97  --  102 109   HDL  --   --  69  --  76 62  --  73 75   TRIG  --   --  121  --  91 109  --  102 92   ALT  --   --  15  --  16 11  --  15 15   CR 0.92 1.04* 1.01*   < > 0.99* 0.97  --  1.03 0.88   GFRESTIMATED 68 59* 61   < > 63 62   < > 54* >60   GFRESTBLACK  --   --   --   --   --   --   --  >60 >60   POTASSIUM 4.4 4.2 4.7   < > 4.4 4.3  --   4.7 4.4   TSH  --   --  3.08  --   --  2.34  --  1.29 1.84    < > = values in this interval not displayed.      Current providers sharing in care for this patient include:  Patient Care Team:  Lorie Ladd MD as PCP - General (Family Practice)  Lorie Ladd MD as Assigned PCP  Ana Harris APRN CNP as Assigned Heart and Vascular Provider    The following health maintenance items are reviewed in Epic and correct as of today:  Health Maintenance   Topic Date Due    RSV VACCINE (1 - Risk 60-74 years 1-dose series) Never done    INFLUENZA VACCINE (1) 09/01/2024    MEDICARE ANNUAL WELLNESS VISIT  11/17/2024    COVID-19 Vaccine (8 - 2024-25 season) 01/06/2025    ANNUAL REVIEW OF HM ORDERS  12/02/2025    FALL RISK ASSESSMENT  12/02/2025    MAMMO SCREENING  10/24/2026    GLUCOSE  09/16/2027    LIPID  11/17/2028    ADVANCE CARE PLANNING  10/18/2029    DTAP/TDAP/TD IMMUNIZATION (3 - Td or Tdap) 11/16/2032    COLORECTAL CANCER SCREENING  01/11/2034    DEXA  11/23/2037    HEPATITIS C SCREENING  Completed    PHQ-2 (once per calendar year)  Completed    Pneumococcal Vaccine: 65+ Years  Completed    ZOSTER IMMUNIZATION  Completed    HPV IMMUNIZATION  Aged Out    MENINGITIS IMMUNIZATION  Aged Out    RSV MONOCLONAL ANTIBODY  Aged Out    PAP  Discontinued       Review of Systems   Constitutional:  Negative for chills and fever.   HENT:  Negative for ear pain and sore throat.    Eyes:  Negative for pain and visual disturbance.   Respiratory:  Negative for cough and shortness of breath.    Cardiovascular:  Negative for chest pain and palpitations.   Gastrointestinal:  Negative for abdominal pain and vomiting.   Genitourinary:  Negative for dysuria and hematuria.   Musculoskeletal:  Negative for arthralgias and back pain.   Skin:  Negative for color change and rash.   Neurological:  Negative for seizures and syncope.   All other systems reviewed and are negative.         Objective    Exam  BP  "113/76 (BP Location: Left arm, Patient Position: Sitting, Cuff Size: Adult Regular)   Pulse 71   Temp 97.7  F (36.5  C) (Temporal)   Resp 14   Ht 1.71 m (5' 7.32\")   Wt 67.1 kg (148 lb)   LMP  (LMP Unknown)   SpO2 97%   BMI 22.96 kg/m     Estimated body mass index is 22.96 kg/m  as calculated from the following:    Height as of this encounter: 1.71 m (5' 7.32\").    Weight as of this encounter: 67.1 kg (148 lb).    Physical Exam  Vitals reviewed.   Constitutional:       General: She is not in acute distress.     Appearance: Normal appearance.   HENT:      Head: Normocephalic.      Right Ear: Tympanic membrane, ear canal and external ear normal.      Left Ear: Tympanic membrane, ear canal and external ear normal.      Nose: Nose normal.      Mouth/Throat:      Mouth: Mucous membranes are moist.      Pharynx: No posterior oropharyngeal erythema.   Eyes:      Extraocular Movements: Extraocular movements intact.      Conjunctiva/sclera: Conjunctivae normal.      Pupils: Pupils are equal, round, and reactive to light.   Cardiovascular:      Rate and Rhythm: Normal rate and regular rhythm.      Pulses: Normal pulses.      Heart sounds: Normal heart sounds. No murmur heard.  Pulmonary:      Effort: Pulmonary effort is normal.      Breath sounds: Normal breath sounds.   Abdominal:      Palpations: Abdomen is soft. There is no mass.      Tenderness: There is no abdominal tenderness. There is no guarding or rebound.   Musculoskeletal:         General: No deformity. Normal range of motion.      Cervical back: Normal range of motion and neck supple.   Lymphadenopathy:      Cervical: No cervical adenopathy.   Skin:     General: Skin is warm and dry.   Neurological:      General: No focal deficit present.      Mental Status: She is alert.   Psychiatric:         Mood and Affect: Mood normal.         Behavior: Behavior normal.       Results for orders placed or performed in visit on 12/02/24   CBC with platelets     Status: " Normal   Result Value Ref Range    WBC Count 5.8 4.0 - 11.0 10e3/uL    RBC Count 5.05 3.80 - 5.20 10e6/uL    Hemoglobin 14.6 11.7 - 15.7 g/dL    Hematocrit 45.0 35.0 - 47.0 %    MCV 89 78 - 100 fL    MCH 28.9 26.5 - 33.0 pg    MCHC 32.4 31.5 - 36.5 g/dL    RDW 12.0 10.0 - 15.0 %    Platelet Count 284 150 - 450 10e3/uL              12/2/2024   Mini Cog   Clock Draw Score 2 Normal   3 Item Recall 3 objects recalled   Mini Cog Total Score 5                11/16/2022   Vision Screen   Reason Vision Screen Not Completed Patient had exam in last 12 months   Patient wears corrective lenses (select all that apply) Worn during vision screen   Vision Screen Results Pass          Signed Electronically by: RICKY ZUNIGA MD        Prior to immunization administration, verified patients identity using patient s name and date of birth. Please see Immunization Activity for additional information.     Screening Questionnaire for Adult Immunization    Are you sick today?   No   Do you have allergies to medications, food, a vaccine component or latex?   Yes   Have you ever had a serious reaction after receiving a vaccination?   No   Do you have a long-term health problem with heart, lung, kidney, or metabolic disease (e.g., diabetes), asthma, a blood disorder, no spleen, complement component deficiency, a cochlear implant, or a spinal fluid leak?  Are you on long-term aspirin therapy?   No   Do you have cancer, leukemia, HIV/AIDS, or any other immune system problem?   No   Do you have a parent, brother, or sister with an immune system problem?   No   In the past 3 months, have you taken medications that affect  your immune system, such as prednisone, other steroids, or anticancer drugs; drugs for the treatment of rheumatoid arthritis, Crohn s disease, or psoriasis; or have you had radiation treatments?   No   Have you had a seizure, or a brain or other nervous system problem?   No   During the past year, have you  received a transfusion of blood or blood    products, or been given immune (gamma) globulin or antiviral drug?   No   For women: Are you pregnant or is there a chance you could become       pregnant during the next month?   No   Have you received any vaccinations in the past 4 weeks?   YES     Immunization questionnaire was positive for at least one answer.  Notified provider.      Patient instructed to remain in clinic for 15 minutes afterwards, and to report any adverse reactions.     Screening performed by Reina Johnson MA on 12/2/2024 at 7:20 AM.

## 2025-01-21 NOTE — PROGRESS NOTES
[facility]  Cardiac Electrophysiology  [address]  [place] [address]   Office: [phone]  Fax: [phone]     HEART CARE ELECTROPHYSIOLOGY NOTE     Primary Care: Lorie Ladd MD       Assessment/Recommendations     Paroxysmal atrial fibrillation/stage 3D: Symptomatic with palpitation.  Status post PVI 9/23/2024.  Unremarkable recovery without symptomatology or documentation of recurrent arrhythmia    [id number] score of 2 for age 65-74, gender.  We discussed the risks and benefits of chronic oral anticoagulation are equivocal.  She would prefer to discontinue oral anticoagulation with ongoing monitoring for recurrent atrial fibrillation.  Resume Xarelto 20 mg daily with dinner if symptoms >24 hours    Status post ASD repair 2014    Plan:  Discontinue Xarelto  Continue atenolol 12.5 mg daily  Follow-up 1 year post ablation     History of Present Illness/Subjective    Summer Kendrick is a 69 year old female with past medical history significant for paroxysmal atrial fibrillation, secundum ASD status post repair 2014, seen today status post PVI 9/23/2024.  Her initial recovery was unremarkable.  She denies recurrent symptoms including palpitations.  She continues to stay active with walking without any problems.  She denies chest discomfort, dyspnea on exertion, pedal edema, lightheadedness/dizziness, orthopnea or syncope.     Data Review     Arrhythmia hx:  Sx: Palpitations  Sx onset: 2014  Dx/date: Postoperative AF 2014 following ASD repair, clinical recurrence 3/2024 converting spontaneously  Rate control: Metoprolol  AAD: Amiodarone (2014)  DCCV: none  Ablation: PVI 9/23/2024 (Dr. Johnson)  [id number] 2 for age 65-74, gender  OAC: Rivaroxaban    EKG   9/23/2024: SR 69 bpm, QRS 92 ms, QT/QTc 450/486 ms  9/16/2024 SR 71 bpm, QRS 70 ms, QT/QTc 400/434 ms  Personally reviewed.     TTE 4/15/2024  Left ventricular size, wall motion and function are  normal. The ejection  fraction is 60-65%.  The right ventricle is mild to moderately dilated.  The left atrium is moderate to severely dilated.  The right atrium is moderate to severely dilated.  There is no color Doppler evidence of an atrial shunt.  IVC diameter <2.1 cm collapsing >50% with sniff suggests a normal RA pressure  of 3 mmHg.  There is mild (1+) aortic regurgitation.    [facility] 7/2024  Indication for study: Atrial fibrillation  Time monitored: 13 days 21 hours.    Predominant rhythm: Normal sinus rhythm.  Conduction intervals are normal.  Patient recordings: Diary: one (irregular beats), and recordings demonstrated normal sinus rhythm with heart rates in the upper 60s and a single atrial couplet.  Triggered (no symptoms): six, and recordings demonstrated normal sinus rhythm with heart rates ranging between 57 and 86 bpm.  There was no ectopy or other pathologic rhythm disturbance.  Auto triggered recordings: recordings demonstrated sinus rhythm with occasional atrial and ventricular ectopy.  Impression:  Essentially normal multiday cardiac patch monitor.  A single symptomatic recording did not correlate with any significant arrhythmia.  No sustained atrial or ventricular tachyarrhythmia.  No profound bradycardia or significant/symptomatic pauses.    I have reviewed and updated the patient's past medical history, allergies and medication list.               Physical Examination Review of Systems   BMI= Body mass index is 23.42 kg/m .    Wt Readings from Last 3 Encounters:   01/27/25 68.5 kg (151 lb)   12/02/24 67.1 kg (148 lb)   11/18/24 67.6 kg (149 lb)       Vitals: /72 (BP Location: Right arm, Patient Position: Sitting, Cuff Size: Adult Regular)   Pulse 77   Resp 16   Wt 68.5 kg (151 lb)   LMP  (LMP Unknown)   BMI 23.42 kg/m    General   Appearance:   Alert and oriented, no acute distress   HEENT:  Normocephalic and atraumatic   Neck: No JVP, carotid bruit or obvious thyromegaly   Lungs:    Respirations unlabored   Cardiovascular:   Rhythm is regular. S1 and S2 are normal. No significant murmur is present   Extremities: No cyanosis or clubbing   Skin: Skin is warm, dry, and otherwise intact   Neurologic: Gait not assessed. Mood and affect appropriate                                                      Medical History  Surgical History Family History Social History   Past Medical History:   Diagnosis Date    Arthritis 02/2022    Heart disease 2013    ASD/repaired/congenital HD    History of blood transfusion 05/2014    after open heart surgery    Past Surgical History:   Procedure Laterality Date    COLONOSCOPY  2012    EP ABLATION PULMONARY VEIN ISOLATION N/A 09/23/2024    Procedure: Ablation Atrial Fibrillation;  Surgeon: Phil Johnson MD;  Location: [facility] CATH LAB CV    ZZC REPR ASD W BYPASS      Description: Repair Of Atrial Secundum Septal Defect;  Recorded: 06/03/2014;    Family History   Problem Relation Age of Onset    Breast Cancer Mother 55    Colon Cancer Maternal Grandfather 85    Social History     Tobacco Use    Smoking status: Never    Smokeless tobacco: Never   Substance Use Topics    Alcohol use: Not Currently     Alcohol/week: 1.0 standard drink of alcohol     Types: 1 Standard drinks or equivalent per week     Comment: Alcoholic Drinks/day: Ocasional    Drug use: No          Medications  Allergies   Current Outpatient Medications   Medication Sig Dispense Refill    atenolol (TENORMIN) 25 MG tablet Take 0.5 tablets (12.5 mg) by mouth daily. 45 tablet 3    cetirizine (ZYRTEC ALLERGY) 10 MG tablet Take 10 mg by mouth daily      cholecalciferol (VITAMIN D3) 10 mcg (400 units) TABS tablet       multivitamin w/minerals (MULTI-VITAMIN) tablet Take 1 tablet by mouth daily. 1/2 TABLET      rivaroxaban ANTICOAGULANT (XARELTO) 20 MG TABS tablet Take 1 tablet (20 mg) by mouth daily (with dinner) 90 tablet 3    Allergies   Allergen Reactions    Nickel Unknown    Penicillins Rash     "     Lab Results    Chemistry/lipid CBC Cardiac Enzymes/BNP/TSH/INR   Lab Results   Component Value Date    BUN 16.9 2024     2024    CO2 26 2024     No results found for: \"CREATININE\"    Lab Results   Component Value Date    CHOL 205 (H) 2024    HDL 70 2024     (H) 2024      Lab Results   Component Value Date    WBC 5.8 2024    HGB 14.6 2024    HCT 45.0 2024    MCV 89 2024     2024    Lab Results   Component Value Date    TSH 3.08 2023        15 minutes spent reviewing prior records (including documentation, laboratory studies, cardiac testing/imaging), history and physical exam, planning, and subsequent documentation.     The longitudinal plan of care for the diagnosis(es)/condition(s) as documented were addressed during this visit. Due to the added complexity in care, I will continue to support Summer in the subsequent management and with ongoing continuity of care.     This note has been dictated using voice recognition software. Any grammatical, typographical, or context distortions are unintentional and inherent to the software.    Ana Harris, CNP  Clinical Cardiac Electrophysiology  [facility]  Clinic and [phone]  Fax: [phone]  Electrophysiology Nurses: [phone]                                 "

## 2025-01-27 ENCOUNTER — OFFICE VISIT (OUTPATIENT)
Dept: CARDIOLOGY | Facility: CLINIC | Age: 70
End: 2025-01-27
Payer: COMMERCIAL

## 2025-01-27 VITALS
BODY MASS INDEX: 23.42 KG/M2 | DIASTOLIC BLOOD PRESSURE: 72 MMHG | HEART RATE: 77 BPM | WEIGHT: 151 LBS | SYSTOLIC BLOOD PRESSURE: 112 MMHG | RESPIRATION RATE: 16 BRPM

## 2025-01-27 DIAGNOSIS — I48.0 PAROXYSMAL ATRIAL FIBRILLATION (H): Primary | ICD-10-CM

## 2025-01-27 DIAGNOSIS — Q21.11 OSTIUM SECUNDUM TYPE ATRIAL SEPTAL DEFECT: ICD-10-CM

## 2025-01-27 DIAGNOSIS — Z98.890 STATUS POST CATHETER ABLATION OF ATRIAL FIBRILLATION: ICD-10-CM

## 2025-01-27 PROCEDURE — G2211 COMPLEX E/M VISIT ADD ON: HCPCS | Performed by: NURSE PRACTITIONER

## 2025-01-27 PROCEDURE — 99213 OFFICE O/P EST LOW 20 MIN: CPT | Performed by: NURSE PRACTITIONER

## 2025-01-27 RX ORDER — ATENOLOL 25 MG/1
12.5 TABLET ORAL DAILY
Qty: 45 TABLET | Refills: 3 | Status: SHIPPED | OUTPATIENT
Start: 2025-01-27

## 2025-01-27 NOTE — LETTER
1/27/2025    LORIE LADD MD  [address] [address]    RE: Summer Kendrick       Dear Colleague,     I had the pleasure of seeing Summer Kendrick in the [facility].       [facility]  Cardiac Electrophysiology  [address]  [place] [address]   Office: [phone]  Fax: [phone]     HEART CARE ELECTROPHYSIOLOGY NOTE     Primary Care: Lorie Ladd MD       Assessment/Recommendations     Paroxysmal atrial fibrillation/stage 3D: Symptomatic with palpitation.  Status post PVI 9/23/2024.  Unremarkable recovery without symptomatology or documentation of recurrent arrhythmia    [id number] score of 2 for age 65-74, gender.  We discussed the risks and benefits of chronic oral anticoagulation are equivocal.  She would prefer to discontinue oral anticoagulation with ongoing monitoring for recurrent atrial fibrillation.  Resume Xarelto 20 mg daily with dinner if symptoms >24 hours    Status post ASD repair 2014    Plan:  Discontinue Xarelto  Continue atenolol 12.5 mg daily  Follow-up 1 year post ablation     History of Present Illness/Subjective    Summer Kendrick is a 69 year old female with past medical history significant for paroxysmal atrial fibrillation, secundum ASD status post repair 2014, seen today status post PVI 9/23/2024.  Her initial recovery was unremarkable.  She denies recurrent symptoms including palpitations.  She continues to stay active with walking without any problems.  She denies chest discomfort, dyspnea on exertion, pedal edema, lightheadedness/dizziness, orthopnea or syncope.     Data Review     Arrhythmia hx:  Sx: Palpitations  Sx onset: 2014  Dx/date: Postoperative AF 2014 following ASD repair, clinical recurrence 3/2024 converting spontaneously  Rate control: Metoprolol  AAD: Amiodarone (2014)  DCCV: none  Ablation: PVI 9/23/2024 (Dr. Johnson)  [id number] 2 for age 65-74, gender  OAC:  Rivaroxaban    EKG   9/23/2024: SR 69 bpm, QRS 92 ms, QT/QTc 450/486 ms  9/16/2024 SR 71 bpm, QRS 70 ms, QT/QTc 400/434 ms  Personally reviewed.     TTE 4/15/2024  Left ventricular size, wall motion and function are normal. The ejection  fraction is 60-65%.  The right ventricle is mild to moderately dilated.  The left atrium is moderate to severely dilated.  The right atrium is moderate to severely dilated.  There is no color Doppler evidence of an atrial shunt.  IVC diameter <2.1 cm collapsing >50% with sniff suggests a normal RA pressure  of 3 mmHg.  There is mild (1+) aortic regurgitation.    [facility] 7/2024  Indication for study: Atrial fibrillation  Time monitored: 13 days 21 hours.    Predominant rhythm: Normal sinus rhythm.  Conduction intervals are normal.  Patient recordings: Diary: one (irregular beats), and recordings demonstrated normal sinus rhythm with heart rates in the upper 60s and a single atrial couplet.  Triggered (no symptoms): six, and recordings demonstrated normal sinus rhythm with heart rates ranging between 57 and 86 bpm.  There was no ectopy or other pathologic rhythm disturbance.  Auto triggered recordings: recordings demonstrated sinus rhythm with occasional atrial and ventricular ectopy.  Impression:  Essentially normal multiday cardiac patch monitor.  A single symptomatic recording did not correlate with any significant arrhythmia.  No sustained atrial or ventricular tachyarrhythmia.  No profound bradycardia or significant/symptomatic pauses.    I have reviewed and updated the patient's past medical history, allergies and medication list.               Physical Examination Review of Systems   BMI= Body mass index is 23.42 kg/m .    Wt Readings from Last 3 Encounters:   01/27/25 68.5 kg (151 lb)   12/02/24 67.1 kg (148 lb)   11/18/24 67.6 kg (149 lb)       Vitals: /72 (BP Location: Right arm, Patient Position: Sitting, Cuff Size: Adult Regular)   Pulse 77   Resp 16   Wt 68.5 kg (151  lb)   LMP  (LMP Unknown)   BMI 23.42 kg/m    General   Appearance:   Alert and oriented, no acute distress   HEENT:  Normocephalic and atraumatic   Neck: No JVP, carotid bruit or obvious thyromegaly   Lungs:   Respirations unlabored   Cardiovascular:   Rhythm is regular. S1 and S2 are normal. No significant murmur is present   Extremities: No cyanosis or clubbing   Skin: Skin is warm, dry, and otherwise intact   Neurologic: Gait not assessed. Mood and affect appropriate                                                      Medical History  Surgical History Family History Social History   Past Medical History:   Diagnosis Date     Arthritis 02/2022     Heart disease 2013    ASD/repaired/congenital HD     History of blood transfusion 05/2014    after open heart surgery    Past Surgical History:   Procedure Laterality Date     COLONOSCOPY  2012     EP ABLATION PULMONARY VEIN ISOLATION N/A 09/23/2024    Procedure: Ablation Atrial Fibrillation;  Surgeon: Phil Johnson MD;  Location: [facility] CATH LAB CV     Z REPR ASD W BYPASS      Description: Repair Of Atrial Secundum Septal Defect;  Recorded: 06/03/2014;    Family History   Problem Relation Age of Onset     Breast Cancer Mother 55     Colon Cancer Maternal Grandfather 85    Social History     Tobacco Use     Smoking status: Never     Smokeless tobacco: Never   Substance Use Topics     Alcohol use: Not Currently     Alcohol/week: 1.0 standard drink of alcohol     Types: 1 Standard drinks or equivalent per week     Comment: Alcoholic Drinks/day: Ocasional     Drug use: No          Medications  Allergies   Current Outpatient Medications   Medication Sig Dispense Refill     atenolol (TENORMIN) 25 MG tablet Take 0.5 tablets (12.5 mg) by mouth daily. 45 tablet 3     cetirizine (ZYRTEC ALLERGY) 10 MG tablet Take 10 mg by mouth daily       cholecalciferol (VITAMIN D3) 10 mcg (400 units) TABS tablet        multivitamin w/minerals (MULTI-VITAMIN) tablet Take 1  "tablet by mouth daily. 1/2 TABLET       rivaroxaban ANTICOAGULANT (XARELTO) 20 MG TABS tablet Take 1 tablet (20 mg) by mouth daily (with dinner) 90 tablet 3    Allergies   Allergen Reactions     Nickel Unknown     Penicillins Rash         Lab Results    Chemistry/lipid CBC Cardiac Enzymes/BNP/TSH/INR   Lab Results   Component Value Date    BUN 16.9 2024     2024    CO2 26 2024     No results found for: \"CREATININE\"    Lab Results   Component Value Date    CHOL 205 (H) 2024    HDL 70 2024     (H) 2024      Lab Results   Component Value Date    WBC 5.8 2024    HGB 14.6 2024    HCT 45.0 2024    MCV 89 2024     2024    Lab Results   Component Value Date    TSH 3.08 2023        15 minutes spent reviewing prior records (including documentation, laboratory studies, cardiac testing/imaging), history and physical exam, planning, and subsequent documentation.     The longitudinal plan of care for the diagnosis(es)/condition(s) as documented were addressed during this visit. Due to the added complexity in care, I will continue to support Summer in the subsequent management and with ongoing continuity of care.     This note has been dictated using voice recognition software. Any grammatical, typographical, or context distortions are unintentional and inherent to the software.    Ana Harris CNP  Clinical Cardiac Electrophysiology  [facility]  Clinic and [phone]  Fax: [phone]  Electrophysiology Nurses: [phone]                                     Thank you for allowing me to participate in the care of your patient.      Sincerely,     DOMITILA Ellis CNP     [facility]  cc:   DOMITILA Ellis CNP  [facility] [address]  [address] [place] [address]      "

## 2025-05-21 ENCOUNTER — OFFICE VISIT (OUTPATIENT)
Dept: FAMILY MEDICINE | Facility: CLINIC | Age: 70
End: 2025-05-21
Payer: COMMERCIAL

## 2025-05-21 VITALS
HEART RATE: 74 BPM | TEMPERATURE: 97.3 F | WEIGHT: 152 LBS | RESPIRATION RATE: 18 BRPM | HEIGHT: 67 IN | BODY MASS INDEX: 23.86 KG/M2 | OXYGEN SATURATION: 99 % | DIASTOLIC BLOOD PRESSURE: 86 MMHG | SYSTOLIC BLOOD PRESSURE: 136 MMHG

## 2025-05-21 DIAGNOSIS — N30.00 ACUTE CYSTITIS WITHOUT HEMATURIA: ICD-10-CM

## 2025-05-21 DIAGNOSIS — Z23 NEED FOR VACCINATION: ICD-10-CM

## 2025-05-21 DIAGNOSIS — R30.0 DYSURIA: Primary | ICD-10-CM

## 2025-05-21 DIAGNOSIS — T30.0 BURN: Primary | ICD-10-CM

## 2025-05-21 LAB
ALBUMIN UR-MCNC: NEGATIVE MG/DL
APPEARANCE UR: CLEAR
BACTERIA #/AREA URNS HPF: ABNORMAL /HPF
BILIRUB UR QL STRIP: NEGATIVE
COLOR UR AUTO: YELLOW
GLUCOSE UR STRIP-MCNC: NEGATIVE MG/DL
HGB UR QL STRIP: ABNORMAL
KETONES UR STRIP-MCNC: NEGATIVE MG/DL
LEUKOCYTE ESTERASE UR QL STRIP: ABNORMAL
MUCOUS THREADS #/AREA URNS LPF: PRESENT /LPF
NITRATE UR QL: POSITIVE
PH UR STRIP: 6 [PH] (ref 5–8)
RBC #/AREA URNS AUTO: ABNORMAL /HPF
SP GR UR STRIP: 1.02 (ref 1–1.03)
SQUAMOUS #/AREA URNS AUTO: ABNORMAL /LPF
UROBILINOGEN UR STRIP-ACNC: 0.2 E.U./DL
WBC #/AREA URNS AUTO: ABNORMAL /HPF

## 2025-05-21 PROCEDURE — 87086 URINE CULTURE/COLONY COUNT: CPT | Performed by: FAMILY MEDICINE

## 2025-05-21 PROCEDURE — 3079F DIAST BP 80-89 MM HG: CPT | Performed by: FAMILY MEDICINE

## 2025-05-21 PROCEDURE — 3075F SYST BP GE 130 - 139MM HG: CPT | Performed by: FAMILY MEDICINE

## 2025-05-21 PROCEDURE — 99213 OFFICE O/P EST LOW 20 MIN: CPT | Performed by: FAMILY MEDICINE

## 2025-05-21 PROCEDURE — 81001 URINALYSIS AUTO W/SCOPE: CPT | Performed by: FAMILY MEDICINE

## 2025-05-21 RX ORDER — SULFAMETHOXAZOLE AND TRIMETHOPRIM 800; 160 MG/1; MG/1
1 TABLET ORAL 2 TIMES DAILY
Qty: 10 TABLET | Refills: 0 | Status: SHIPPED | OUTPATIENT
Start: 2025-05-21 | End: 2025-05-26

## 2025-05-21 NOTE — PROGRESS NOTES
"  {PROVIDER CHARTING PREFERENCE:644496}    Oksana Wheeler is a 69 year old, presenting for the following health issues:  Possible UTI (Burning when urinating, odor smell, and cloudy urine.)      5/21/2025     7:56 AM   Additional Questions   Roomed by Irma SONG   Accompanied by self     Just started yesterday   Odor started a couple days ago   Yesterday - urgency, then pink tinge  Frequency by last evening -     Going to the [facility] tomorrow x 5 days      History of Present Illness       Reason for visit:  Bladder infection  Symptom onset:  1-3 days ago  Symptom intensity:  Mild  Symptom progression:  Staying the same  Had these symptoms before:  Yes  Has tried/received treatment for these symptoms:  Yes  Previous treatment was successful:  Yes  Prior treatment description:  Antibiotics   She is taking medications regularly.        Pre-Provider Visit Orders- Urinalysis UA/UC  Patient reports the following symptoms:  possible urinary tract infection (UTI) , discomfort, pain or burning with urination , frequent urination , foul-smelling urine , and cloudy urine   Does the patient report any of the following symptoms: vaginal discharge, vaginal itching, possible yeast infection, has a urinary catheter in place, or unable to void in a specimen cup?  No    {OK to proceed with order Link to Pre-Provider Visit Standing Orders SmartSet :014095}  {SUPERLIST (Optional):278421}  {additonal problems for provider to add (Optional):885023}    {ROS Picklists (Optional):586255}      Objective    /86 (BP Location: Left arm, Patient Position: Sitting, Cuff Size: Adult Regular)   Pulse 74   Temp 97.3  F (36.3  C) (Oral)   Resp 18   Ht 1.706 m (5' 7.17\")   Wt 68.9 kg (152 lb)   LMP  (LMP Unknown)   SpO2 99%   BMI 23.69 kg/m    Body mass index is 23.69 kg/m .  Physical Exam   {Exam List (Optional):805725}    {Diagnostic Test Results (Optional):107146}        Signed Electronically by: RICKY ZUNIGA, " MD  {Email feedback regarding this note to [email or web address]   :677605}   range of motion.      Cervical back: Normal range of motion and neck supple.   Lymphadenopathy:      Cervical: No cervical adenopathy.   Skin:     General: Skin is warm and dry.   Neurological:      General: No focal deficit present.      Mental Status: She is alert.   Psychiatric:         Mood and Affect: Mood normal.         Behavior: Behavior normal.            Results for orders placed or performed in visit on 05/21/25   UA with Microscopic reflex to Culture - Clinic Collect     Status: Abnormal    Specimen: Urine, Midstream   Result Value Ref Range    Color Urine Yellow Colorless, Straw, Light Yellow, Yellow    Appearance Urine Clear Clear    Glucose Urine Negative Negative mg/dL    Bilirubin Urine Negative Negative    Ketones Urine Negative Negative mg/dL    Specific Gravity Urine 1.020 1.005 - 1.030    Blood Urine Trace (A) Negative    pH Urine 6.0 5.0 - 8.0    Protein Albumin Urine Negative Negative mg/dL    Urobilinogen Urine 0.2 0.2, 1.0 E.U./dL    Nitrite Urine Positive (A) Negative    Leukocyte Esterase Urine Large (A) Negative   Urine Microscopic Exam     Status: Abnormal   Result Value Ref Range    Bacteria Urine None Seen None Seen /HPF    RBC Urine 2-5 (A) 0-2 /HPF /HPF    WBC Urine 10-25 (A) 0-5 /HPF /HPF    Squamous Epithelials Urine Moderate (A) None Seen /LPF    Mucus Urine Present (A) None Seen /LPF   Urine Culture     Status: None    Specimen: Urine, Midstream   Result Value Ref Range    Culture >100,000 CFU/mL Mixture of urogenital lilo     Narrative    Multiple morphotypes present with no predominant organism.  Growth consistent with probable contamination during collection.  Suggest repeat specimen if clinically indicated.            Signed Electronically by: RICKY ZUNIGA MD

## 2025-05-21 NOTE — PROGRESS NOTES
Prior to immunization administration, verified patients identity using patient s name and date of birth. Please see Immunization Activity for additional information.     Screening Questionnaire for Adult Immunization    Are you sick today?   No   Do you have allergies to medications, food, a vaccine component or latex?   Yes   Have you ever had a serious reaction after receiving a vaccination?   No   Do you have a long-term health problem with heart, lung, kidney, or metabolic disease (e.g., diabetes), asthma, a blood disorder, no spleen, complement component deficiency, a cochlear implant, or a spinal fluid leak?  Are you on long-term aspirin therapy?   No   Do you have cancer, leukemia, HIV/AIDS, or any other immune system problem?   No   Do you have a parent, brother, or sister with an immune system problem?   No   In the past 3 months, have you taken medications that affect  your immune system, such as prednisone, other steroids, or anticancer drugs; drugs for the treatment of rheumatoid arthritis, Crohn s disease, or psoriasis; or have you had radiation treatments?   No   Have you had a seizure, or a brain or other nervous system problem?   No   During the past year, have you received a transfusion of blood or blood    products, or been given immune (gamma) globulin or antiviral drug?   No   For women: Are you pregnant or is there a chance you could become       pregnant during the next month?   No   Have you received any vaccinations in the past 4 weeks?   No     Immunization questionnaire was positive for at least one answer.  Notified provider.      Patient instructed to remain in clinic for 15 minutes afterwards, and to report any adverse reactions.     Screening performed by Irma Angulo MA on 5/21/2025 at 7:59 AM.        Answers submitted by the patient for this visit:  General Questionnaire (Submitted on 5/21/2025)  Chief Complaint: Chronic problems general questions HPI Form  How many days per week do you  miss taking your medication?: 0  General Concern (Submitted on 5/21/2025)  Chief Complaint: Chronic problems general questions HPI Form  What is the reason for your visit today?: bladder infection  When did your symptoms begin?: 1-3 days ago  How would you describe these symptoms?: Mild  Are your symptoms:: Staying the same  Have you had these symptoms before?: Yes  Have you tried or received treatment for these symptoms before?: Yes  Did that treatment work? : Yes  Please describe the treatment you had:: antibiotics  Questionnaire about: Chronic problems general questions HPI Form (Submitted on 5/21/2025)  Chief Complaint: Chronic problems general questions HPI Form

## 2025-05-22 ENCOUNTER — RESULTS FOLLOW-UP (OUTPATIENT)
Dept: FAMILY MEDICINE | Facility: CLINIC | Age: 70
End: 2025-05-22

## 2025-05-22 LAB — BACTERIA UR CULT: NORMAL

## 2025-06-01 ASSESSMENT — ENCOUNTER SYMPTOMS
CHILLS: 0
SEIZURES: 0
COUGH: 0
COLOR CHANGE: 0
BACK PAIN: 0
ARTHRALGIAS: 0
FREQUENCY: 1
SORE THROAT: 0
VOMITING: 0
EYE PAIN: 0
DYSURIA: 1
FEVER: 0
SHORTNESS OF BREATH: 0
HEMATURIA: 0
PALPITATIONS: 0
ABDOMINAL PAIN: 0

## (undated) DEVICE — INTRO SHEATH 9FRX10CM PINNACLE RSS902

## (undated) DEVICE — PROBE TEMP CIRCA S-CATH M ESPH 12-SNSR 3D MAP CS-21EP

## (undated) DEVICE — CATHETER OCTARAY LONG SPLINE CURVE F 3-3-3-3-3 D160906

## (undated) DEVICE — PATCH CARTO 3 EXTERNAL REFERENCE 3D MAPPING CREFP6

## (undated) DEVICE — CUSTOM PACK EP

## (undated) DEVICE — CATH EP 7FR X 115CM DECANAV CA

## (undated) DEVICE — 8F SOUNDSTAR ECO ULTRASOUND CATHETER

## (undated) DEVICE — INTRODUCER SHEATH VASC CATH 8.5FR CARTO GIDE STH MED D138502

## (undated) DEVICE — TUBE SET SMARKABLATE IRRIGATION

## (undated) DEVICE — CATH TRANSSEPTAL VERSACROSS 45D 63X230CM VXSK0032

## (undated) DEVICE — ELECTRODE DEFIB CADENCE 22550R

## (undated) DEVICE — CATH ABLATION 8FR 115CM F-J CURVE BI-DIR QDOT MICRO D139504

## (undated) DEVICE — TRANSDUCER TRAY ARTERIAL 42646-06

## (undated) DEVICE — INTRO SHEATH 8FRX10CM PINNACLE RSS802

## (undated) RX ORDER — PROTAMINE SULFATE 10 MG/ML
INJECTION, SOLUTION INTRAVENOUS
Status: DISPENSED
Start: 2024-09-23

## (undated) RX ORDER — FENTANYL CITRATE 50 UG/ML
INJECTION, SOLUTION INTRAMUSCULAR; INTRAVENOUS
Status: DISPENSED
Start: 2024-09-23